# Patient Record
Sex: FEMALE | Race: WHITE | NOT HISPANIC OR LATINO | Employment: OTHER | URBAN - METROPOLITAN AREA
[De-identification: names, ages, dates, MRNs, and addresses within clinical notes are randomized per-mention and may not be internally consistent; named-entity substitution may affect disease eponyms.]

---

## 2017-02-08 ENCOUNTER — OFFICE VISIT (OUTPATIENT)
Dept: OPHTHALMOLOGY | Facility: CLINIC | Age: 72
End: 2017-02-08
Payer: COMMERCIAL

## 2017-02-08 DIAGNOSIS — H40.052 OCULAR HYPERTENSION OF LEFT EYE: Primary | ICD-10-CM

## 2017-02-08 DIAGNOSIS — H26.8 PSEUDOEXFOLIATION SYNDROME: ICD-10-CM

## 2017-02-08 PROCEDURE — 92133 CPTRZD OPH DX IMG PST SGM ON: CPT | Performed by: OPHTHALMOLOGY

## 2017-02-08 PROCEDURE — 92012 INTRM OPH EXAM EST PATIENT: CPT | Performed by: OPHTHALMOLOGY

## 2017-02-08 PROCEDURE — 92083 EXTENDED VISUAL FIELD XM: CPT | Performed by: OPHTHALMOLOGY

## 2017-02-08 PROCEDURE — 76514 ECHO EXAM OF EYE THICKNESS: CPT | Performed by: OPHTHALMOLOGY

## 2017-02-08 ASSESSMENT — EXTERNAL EXAM - LEFT EYE: OS_EXAM: NORMAL

## 2017-02-08 ASSESSMENT — TONOMETRY
OS_IOP_MMHG: 21
IOP_METHOD: TONOPEN
OD_IOP_MMHG: 15

## 2017-02-08 ASSESSMENT — VISUAL ACUITY
OD_CC+: -2
OS_CC: 20/40
CORRECTION_TYPE: GLASSES
OS_CC+: -2
OD_CC: 20/30
OS_PH_CC: 20/30-2
METHOD: SNELLEN - LINEAR

## 2017-02-08 ASSESSMENT — PACHYMETRY
OD_CT(UM): .506
OS_CT(UM): .489

## 2017-02-08 ASSESSMENT — EXTERNAL EXAM - RIGHT EYE: OD_EXAM: NORMAL

## 2017-02-08 NOTE — PROGRESS NOTES
Current Eye Medications:  Art. Tears both eyes     Subjective: Here for IOP/ HVF/OCT and Pachy today.  Uses Flonase daily as well, 2 sprays each nose.      Objective:  See Ophthalmology Exam.       Assessment:  Stable intraocular pressure both eyes.  Baseline glaucoma OCT and Jennings Visual Field within normal limits both eyes.  Thin corneas on pachymetry.      Plan: Continue observation regarding glaucoma suspect status  Use artificial tears up to 4 times daily (Refresh Tears or Systane Ultra/Balance)   Return visit 6 months for a complete exam     Dhruv Estrella M.D.

## 2017-02-08 NOTE — MR AVS SNAPSHOT
"              After Visit Summary   2/8/2017    Sharmin Shipley    MRN: [id number]           Patient Information     Date Of Birth          1945        Visit Information        Provider Department      2/8/2017 1:15 PM Dhruv Estrella MD [facility]        Today's Diagnoses     Ocular hypertension of left eye    -  1       Care Instructions    Continue observation regarding glaucoma suspect status  Return visit 6 months for a complete exam     Dhruv Estrella M.D.          Follow-ups after your visit        Your next 10 appointments already scheduled     Aug 15, 2017  1:00 PM   New Visit with Dhruv Estrella MD   [facility] ([facility]    [address] [address] [phone]              Who to contact     If you have questions or need follow up information about today's clinic visit or your schedule please contact [facility] directly at [phone].  Normal or non-critical lab and imaging results will be communicated to you by MyChart, letter or phone within 4 business days after the clinic has received the results. If you do not hear from us within 7 days, please contact the clinic through BioDermhart or phone. If you have a critical or abnormal lab result, we will notify you by phone as soon as possible.  Submit refill requests through [employer] or call your pharmacy and they will forward the refill request to us. Please allow 3 business days for your refill to be completed.          Additional Information About Your Visit        BioDermhart Information     [employer] lets you send messages to your doctor, view your test results, renew your prescriptions, schedule appointments and more. To sign up, go to [email or web address] . Click on \"Log in\" on the left side of the screen, which will take you to the Welcome page. Then click on \"Sign up Now\" on the right side of the page.     You will be asked to enter the access code listed " below, as well as some personal information. Please follow the directions to create your username and password.     Your access code is: [email or web address]  Expires: 2017  2:36 PM     Your access code will  in 90 days. If you need help or a new code, please call your [place] clinic or [phone].        Care EveryWhere ID     This is your Care EveryWhere ID. This could be used by other organizations to access your [place] medical records  [id number]         Blood Pressure from Last 3 Encounters:   No data found for BP    Weight from Last 3 Encounters:   No data found for Wt              Today, you had the following     No orders found for display       Primary Care Provider Office Phone # Fax #    Edilberto DALTON Tariq [phone]       [facility] [address] [address]        Thank you!     Thank you for choosing [facility] [place]  for your care. Our goal is always to provide you with excellent care. Hearing back from our patients is one way we can continue to improve our services. Please take a few minutes to complete the written survey that you may receive in the mail after your visit with us. Thank you!             Your Updated Medication List - Protect others around you: Learn how to safely use, store and throw away your medicines at www.disposemymeds.org.          This list is accurate as of: 17  2:36 PM.  Always use your most recent med list.                   Brand Name Dispense Instructions for use    ACIPHEX PO      Take  by mouth.       amLODIPine 5 MG tablet    NORVASC     Take 5 mg by mouth daily.       COREG PO      Take  by mouth.       COZAAR PO      Take  by mouth.       hydrochlorothiazide 12.5 MG capsule    MICROZIDE     Take 12.5 mg by mouth daily.       lisinopril 5 MG tablet    PRINIVIL/ZESTRIL     Take 5 mg by mouth daily       PLAVIX 75 MG tablet   Generic drug:  clopidogrel      Take by mouth daily       SYNTHROID PO      Take  by  mouth.

## 2017-02-08 NOTE — PATIENT INSTRUCTIONS
Continue observation regarding glaucoma suspect status  Use artificial tears up to 4 times daily (Refresh Tears or Systane Ultra/Balance)   Return visit 6 months for a complete exam     Dhruv Estrella M.D.

## 2017-04-05 ENCOUNTER — TELEPHONE (OUTPATIENT)
Dept: OPHTHALMOLOGY | Facility: CLINIC | Age: 72
End: 2017-04-05

## 2017-04-05 NOTE — TELEPHONE ENCOUNTER
Patient is taking a medication that she thinks may be causing her high eye pressures with glaucoma. She would like to know if she should be taking this medication or not:      hydrochlorothiazide (MICROZIDE) 12.5 MG capsule      Please call the patient at home. Thank you.

## 2017-04-05 NOTE — TELEPHONE ENCOUNTER
Returned patient's call regarding HCTZ and increase in IOP.  I informed patient that increase in intraocular pressure is not one of the side effects of HCTZ, and not to be concerned.

## 2017-08-15 ENCOUNTER — OFFICE VISIT (OUTPATIENT)
Dept: OPHTHALMOLOGY | Facility: CLINIC | Age: 72
End: 2017-08-15
Payer: COMMERCIAL

## 2017-08-15 DIAGNOSIS — H18.9 KERATOPATHY: ICD-10-CM

## 2017-08-15 DIAGNOSIS — H25.813 COMBINED FORMS OF AGE-RELATED CATARACT OF BOTH EYES: Primary | ICD-10-CM

## 2017-08-15 DIAGNOSIS — H52.4 PRESBYOPIA: ICD-10-CM

## 2017-08-15 DIAGNOSIS — H43.813 POSTERIOR VITREOUS DETACHMENT, BILATERAL: ICD-10-CM

## 2017-08-15 DIAGNOSIS — H26.8 PSEUDOEXFOLIATION SYNDROME: ICD-10-CM

## 2017-08-15 DIAGNOSIS — H40.052 OCULAR HYPERTENSION OF LEFT EYE: ICD-10-CM

## 2017-08-15 PROCEDURE — 92014 COMPRE OPH EXAM EST PT 1/>: CPT | Performed by: OPHTHALMOLOGY

## 2017-08-15 PROCEDURE — 92015 DETERMINE REFRACTIVE STATE: CPT | Performed by: OPHTHALMOLOGY

## 2017-08-15 ASSESSMENT — TONOMETRY
OD_IOP_MMHG: 14
IOP_METHOD: ICARE
OD_IOP_MMHG: 17
OS_IOP_MMHG: 17
IOP_METHOD: TONOPEN
OS_IOP_MMHG: 23

## 2017-08-15 ASSESSMENT — CONF VISUAL FIELD
OS_NORMAL: 1
OD_NORMAL: 1

## 2017-08-15 ASSESSMENT — EXTERNAL EXAM - RIGHT EYE: OD_EXAM: NORMAL

## 2017-08-15 ASSESSMENT — REFRACTION_WEARINGRX
OS_SPHERE: +1.75
OS_ADD: +3.00
OD_CYLINDER: +1.25
OD_AXIS: 030
OS_CYLINDER: +1.75
OD_SPHERE: +0.75
SPECS_TYPE: PAL
OD_ADD: +3.00
OS_AXIS: 060

## 2017-08-15 ASSESSMENT — VISUAL ACUITY
OS_CC: J1 CLOSE
OD_CC: 20/30
OS_CC: 20/50
CORRECTION_TYPE: GLASSES
OD_CC: J2+
METHOD: SNELLEN - LINEAR

## 2017-08-15 ASSESSMENT — REFRACTION_MANIFEST
OD_ADD: +3.00
OD_SPHERE: +1.25
OS_AXIS: 080
OD_AXIS: 016
OS_SPHERE: +0.25
OS_CYLINDER: +1.25
OS_ADD: +3.00
OD_CYLINDER: +1.25

## 2017-08-15 ASSESSMENT — CUP TO DISC RATIO
OD_RATIO: 0.2
OS_RATIO: 0.4

## 2017-08-15 ASSESSMENT — EXTERNAL EXAM - LEFT EYE: OS_EXAM: NORMAL

## 2017-08-15 NOTE — PROGRESS NOTES
Current Eye Medications:  no     Subjective:  Comprehensive eye exam.   Pt reports she is seeing pretty good from her point of view and eyes seem otherwise fine, has not really noticed any change in her vision or eyes.     Objective:  See Ophthalmology Exam.       Assessment:  Stable eye exam.      ICD-10-CM    1. 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 Keratopathy, mild-mod, os > od H18.9    5. Posterior vitreous detachment, bilateral H43.813    6. Presbyopia H52.4 REFRACTIVE STATUS        Plan:  Possible posterior vitreous detachment (sudden onset large floater and/or flashing lights) discussed. Right eye   Continue observation with regard to glaucoma suspect status.  Glasses Rx given - optional   Call in April 2018 for an appointment in August 2018 for Complete Exam    Dr. Estrella [phone]

## 2017-08-15 NOTE — PATIENT INSTRUCTIONS
Possible posterior vitreous detachment (sudden onset large floater and/or flashing lights) discussed. Right eye   Continue observation with regard to glaucoma suspect status.  Glasses Rx given - optional   Call in April 2018 for an appointment in August 2018 for Complete Exam    Dr. Estrella [phone]

## 2017-08-15 NOTE — MR AVS SNAPSHOT
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 Right eye   Continue observation with regard to glaucoma suspect status.  Glasses Rx given - optional   Call in April 2018 for an appointment in August 2018 for Complete Exam    Dr. Estrella [phone]          Follow-ups after your visit        Who to contact     If you have questions or need follow up information about today's clinic visit or your schedule please contact [facility] directly at [phone].  Normal or non-critical lab and imaging results will be communicated to you by MyChart, letter or phone within 4 business days after the clinic has received the results. If you do not hear from us within 7 days, please contact the clinic through [employer] or phone. If you have a critical or abnormal lab result, we will notify you by phone as soon as possible.  Submit refill requests through [employer] or call your pharmacy and they will forward the refill request to us. Please allow 3 business days for your refill to be completed.          Additional Information About Your Visit        MyChart Information     [employer] lets you send messages to your doctor, view your test results, renew your prescriptions, schedule appointments and more. To sign up, go to [email or web address] .  "Click on \"Log in\" on the left side of the screen, which will take you to the Welcome page. Then click on \"Sign up Now\" on the right side of the page.     You will be asked to enter the access code listed below, as well as some personal information. Please follow the directions to create your username and password.     Your access code is: [email or web address]  Expires: 2017  2:00 PM     Your access code will  in 90 days. If you need help or a new code, please call your [place] clinic or [phone].        Care EveryWhere ID     This is your Care EveryWhere ID. This could be used by other organizations to access your [place] medical records  [id number]         Blood Pressure from Last 3 Encounters:   No data found for BP    Weight from Last 3 Encounters:   No data found for Wt              Today, you had the following     No orders found for display       Primary Care Provider Office Phone # Fax #    Edilberto DALTON Tariq [phone]       [facility] [address] [address]        Equal Access to Services     [facility] Hospital[facility] : Hadii aad ku hadasho Soomaali, waaxda luqadaha, qaybta kaalmada adeluz elenayamary, alli childs . So [facility] [phone].    ATENCIÓN: Si habla español, tiene a jones disposición servicios gratuitos de asistencia lingüística. Earnestine al [phone].    We comply with applicable federal civil rights laws and [place] laws. We do not discriminate on the basis of race, color, national origin, age, disability sex, sexual orientation or gender identity.            Thank you!     Thank you for choosing [facility] [place]  for your care. Our goal is always to provide you with excellent care. Hearing back from our patients is one way we can continue to improve our services. Please take a few minutes to complete the written survey that you may receive in the mail after your visit with us. Thank you!             Your Updated Medication List " - Protect others around you: Learn how to safely use, store and throw away your medicines at www.disposemymeds.org.          This list is accurate as of: 8/15/17  2:01 PM.  Always use your most recent med list.                   Brand Name Dispense Instructions for use Diagnosis    ACIPHEX PO      Take  by mouth.        amLODIPine 5 MG tablet    NORVASC     Take 5 mg by mouth daily.        COREG PO      Take  by mouth.        COZAAR PO      Take  by mouth.        hydrochlorothiazide 12.5 MG capsule    MICROZIDE     Take 12.5 mg by mouth daily.        lisinopril 5 MG tablet    PRINIVIL/ZESTRIL     Take 5 mg by mouth daily        PLAVIX 75 MG tablet   Generic drug:  clopidogrel      Take by mouth daily        SYNTHROID PO      Take  by mouth.

## 2017-09-18 ENCOUNTER — TELEPHONE (OUTPATIENT)
Dept: OPHTHALMOLOGY | Facility: CLINIC | Age: 72
End: 2017-09-18

## 2017-09-18 NOTE — TELEPHONE ENCOUNTER
Spoke to patient, she would rather come tomorrow for her Rx recheck instead of Thursday.  She will be here at 3:45 pm.

## 2017-09-18 NOTE — TELEPHONE ENCOUNTER
Called and left message that patient could come tomorrow at 3:45 or 4 instead of Thursday this week for a glasses recheck.

## 2017-09-19 ENCOUNTER — OFFICE VISIT (OUTPATIENT)
Dept: OPHTHALMOLOGY | Facility: CLINIC | Age: 72
End: 2017-09-19
Payer: COMMERCIAL

## 2017-09-19 DIAGNOSIS — H52.4 PRESBYOPIA: Primary | ICD-10-CM

## 2017-09-19 PROCEDURE — 99207 ZZC NO BILLABLE SERVICE THIS VISIT: CPT | Performed by: OPHTHALMOLOGY

## 2017-09-19 ASSESSMENT — REFRACTION_MANIFEST
OS_ADD: +3.50
OD_AXIS: 015
OS_AXIS: 075
OS_CYLINDER: +1.25
OD_CYLINDER: +1.50
OD_ADD: +3.50
OD_SPHERE: +0.50
OS_SPHERE: +0.50

## 2017-09-19 ASSESSMENT — VISUAL ACUITY
OD_CC: J2+
OS_CC: 20/25
METHOD: SNELLEN - LINEAR
CORRECTION_TYPE: GLASSES
OD_CC: 20/25
OS_CC: J2

## 2017-09-19 ASSESSMENT — REFRACTION_WEARINGRX
OS_ADD: +3.00
OD_ADD: +3.00
OD_CYLINDER: +1.50
OS_AXIS: 080
OS_CYLINDER: +1.25
OS_SPHERE: +0.50
OD_AXIS: 020
OD_SPHERE: +1.25

## 2017-09-19 NOTE — PROGRESS NOTES
Current Eye Medications:  no     Subjective:  Problem with glasses.  Pt reports can read better with her old glasses compared to her older ones. I carefully refracted pt. I found a difference mostly in right eye. Also noted that pt is overplused in old glasses giving her additional reading power,with regrind  I made the add a +3.50 rather than a plus +3.00.     Objective:  See Ophthalmology Exam.       Assessment:  Refractive shift.      Plan:  Regrind per tech assessment.  Dhruv Estrella M.D.

## 2017-09-19 NOTE — MR AVS SNAPSHOT
"              After Visit Summary   2017    Sharmin Shipley    MRN: [id number]           Patient Information     Date Of Birth          1945        Visit Information        Provider Department      2017 4:00 PM Dhruv Estrella MD [facility]        Today's Diagnoses     Presbyopia    -  1      Care Instructions    See progress note.          Follow-ups after your visit        Who to contact     If you have questions or need follow up information about today's clinic visit or your schedule please contact [facility] directly at [phone].  Normal or non-critical lab and imaging results will be communicated to you by P[employer]ruhart, letter or phone within 4 business days after the clinic has received the results. If you do not hear from us within 7 days, please contact the clinic through P[employer]ruhart or phone. If you have a critical or abnormal lab result, we will notify you by phone as soon as possible.  Submit refill requests through [employer] or call your pharmacy and they will forward the refill request to us. Please allow 3 business days for your refill to be completed.          Additional Information About Your Visit        MyChart Information     [employer] lets you send messages to your doctor, view your test results, renew your prescriptions, schedule appointments and more. To sign up, go to [email or web address] . Click on \"Log in\" on the left side of the screen, which will take you to the Welcome page. Then click on \"Sign up Now\" on the right side of the page.     You will be asked to enter the access code listed below, as well as some personal information. Please follow the directions to create your username and password.     Your access code is: [email or web address]  Expires: 2017  2:00 PM     Your access code will  in 90 days. If you need help or a new code, please call your [facility] or [phone].        Care EveryWhere ID     This is your Care EveryWhere ID. " This could be used by other organizations to access your [place] medical records  [id number]         Blood Pressure from Last 3 Encounters:   No data found for BP    Weight from Last 3 Encounters:   No data found for Wt              We Performed the Following     NO CHARGE LOS        Primary Care Provider Office Phone # Fax #    Edilberto Tariq [phone]       [facility] [address] [address]        Equal Access to Services     YAMIL SHEEHAN : Hadii aad ku hadasho Soomaali, waaxda luqadaha, qaybta kaalmada adeegyada, waxay idiin hayaan adeeg khashishsh la'navid . So [facility] [phone].    ATENCIÓN: Si habla español, tiene a jones disposición servicios gratuitos de asistencia lingüística. Llame al [phone].    We comply with applicable federal civil rights laws and [place] laws. We do not discriminate on the basis of race, color, national origin, age, disability sex, sexual orientation or gender identity.            Thank you!     Thank you for choosing [facility] [place]  for your care. Our goal is always to provide you with excellent care. Hearing back from our patients is one way we can continue to improve our services. Please take a few minutes to complete the written survey that you may receive in the mail after your visit with us. Thank you!             Your Updated Medication List - Protect others around you: Learn how to safely use, store and throw away your medicines at www.disposemymeds.org.          This list is accurate as of: 9/19/17 11:14 PM.  Always use your most recent med list.                   Brand Name Dispense Instructions for use Diagnosis    ACIPHEX PO      Take  by mouth.        amLODIPine 5 MG tablet    NORVASC     Take 5 mg by mouth daily.        COREG PO      Take  by mouth.        COZAAR PO      Take  by mouth.        hydrochlorothiazide 12.5 MG capsule    MICROZIDE     Take 12.5 mg by mouth daily.        lisinopril 5 MG tablet     PRINIVIL/ZESTRIL     Take 5 mg by mouth daily        PLAVIX 75 MG tablet   Generic drug:  clopidogrel      Take by mouth daily        SYNTHROID PO      Take  by mouth.

## 2018-08-17 ENCOUNTER — OFFICE VISIT (OUTPATIENT)
Dept: OPHTHALMOLOGY | Facility: CLINIC | Age: 73
End: 2018-08-17
Payer: COMMERCIAL

## 2018-08-17 DIAGNOSIS — H52.4 PRESBYOPIA: ICD-10-CM

## 2018-08-17 DIAGNOSIS — H25.811 COMBINED FORMS OF AGE-RELATED CATARACT OF RIGHT EYE: ICD-10-CM

## 2018-08-17 DIAGNOSIS — H40.052 OCULAR HYPERTENSION OF LEFT EYE: ICD-10-CM

## 2018-08-17 DIAGNOSIS — H18.9 KERATOPATHY: ICD-10-CM

## 2018-08-17 DIAGNOSIS — H25.812 COMBINED FORMS OF AGE-RELATED CATARACT OF LEFT EYE: Primary | ICD-10-CM

## 2018-08-17 PROCEDURE — 92015 DETERMINE REFRACTIVE STATE: CPT | Performed by: OPHTHALMOLOGY

## 2018-08-17 PROCEDURE — 92014 COMPRE OPH EXAM EST PT 1/>: CPT | Performed by: OPHTHALMOLOGY

## 2018-08-17 RX ORDER — LATANOPROST 50 UG/ML
1 SOLUTION/ DROPS OPHTHALMIC AT BEDTIME
Qty: 3 BOTTLE | Refills: 4 | Status: SHIPPED | OUTPATIENT
Start: 2018-08-17 | End: 2019-03-04

## 2018-08-17 RX ORDER — LATANOPROST 50 UG/ML
1 SOLUTION/ DROPS OPHTHALMIC AT BEDTIME
Qty: 1 BOTTLE | Refills: 11 | Status: SHIPPED | OUTPATIENT
Start: 2018-08-17 | End: 2019-08-20

## 2018-08-17 ASSESSMENT — REFRACTION_WEARINGRX
OD_ADD: +3.00
OS_AXIS: 080
OS_CYLINDER: +1.25
OD_AXIS: 020
OS_ADD: +3.00
OS_SPHERE: +0.50
OD_SPHERE: +1.25
OD_CYLINDER: +1.50

## 2018-08-17 ASSESSMENT — TONOMETRY
OS_IOP_MMHG: 28
IOP_METHOD: ICARE
OD_IOP_MMHG: 15

## 2018-08-17 ASSESSMENT — CUP TO DISC RATIO
OS_RATIO: 0.4
OD_RATIO: 0.2

## 2018-08-17 ASSESSMENT — REFRACTION_MANIFEST
OS_AXIS: 080
OD_SPHERE: +1.25
OD_ADD: +2.75
OS_SPHERE: PLANO
OD_CYLINDER: +1.00
OD_AXIS: 015
OS_ADD: +2.75
OS_CYLINDER: +1.00

## 2018-08-17 ASSESSMENT — CONF VISUAL FIELD
OD_NORMAL: 1
OS_NORMAL: 1
METHOD: COUNTING FINGERS

## 2018-08-17 ASSESSMENT — VISUAL ACUITY
METHOD: SNELLEN - LINEAR
OD_CC+: -1
OS_PH_CC: 20/40-1
OS_CC+: -2
CORRECTION_TYPE: GLASSES
OS_CC: 20/50
OD_CC: 20/30

## 2018-08-17 NOTE — MR AVS SNAPSHOT
"              After Visit Summary   8/17/2018    Sharmin Shipley    MRN: [id number]           Patient Information     Date Of Birth          1945        Visit Information        Provider Department      8/17/2018 10:30 AM Dhruv Estrella MD [facility]        Today's Diagnoses     Presbyopia    -  1    Keratopathy, mild-mod, os > od        Ocular hypertension of left eye          Care Instructions    Start Latanoprost drop both eyes at bedtime.  Continue same glasses  Return visit in one month for an intraocular pressure check and gonioscopy.  Offered cataract surgery left eye at anytime. Call Ivan BREANNA @ [phone] to schedule.  Dhruv Estrella M.D.  [phone]              Follow-ups after your visit        Who to contact     If you have questions or need follow up information about today's clinic visit or your schedule please contact [facility] directly at [phone].  Normal or non-critical lab and imaging results will be communicated to you by adRisehart, letter or phone within 4 business days after the clinic has received the results. If you do not hear from us within 7 days, please contact the clinic through adRisehart or phone. If you have a critical or abnormal lab result, we will notify you by phone as soon as possible.  Submit refill requests through [employer] or call your pharmacy and they will forward the refill request to us. Please allow 3 business days for your refill to be completed.          Additional Information About Your Visit        adRiseharCare2Manage Information     [employer] lets you send messages to your doctor, view your test results, renew your prescriptions, schedule appointments and more. To sign up, go to [email or web address] . Click on \"Log in\" on the left side of the screen, which will take you to the Welcome page. Then click on \"Sign up Now\" on the right side of the page.     You will be asked to enter the access code listed below, as well as some personal " information. Please follow the directions to create your username and password.     Your access code is: [email or web address]  Expires: 11/15/2018 10:27 AM     Your access code will  in 90 days. If you need help or a new code, please call your [place] clinic or [phone].        Care EveryWhere ID     This is your Care EveryWhere ID. This could be used by other organizations to access your [place] medical records  [id number]         Blood Pressure from Last 3 Encounters:   No data found for BP    Weight from Last 3 Encounters:   No data found for Wt              We Performed the Following     EYE EXAM (SIMPLE-NONBILLABLE)     REFRACTIVE STATUS          Today's Medication Changes          These changes are accurate as of 18 12:09 PM.  If you have any questions, ask your nurse or doctor.               Start taking these medicines.        Dose/Directions    latanoprost 0.005 % ophthalmic solution   Commonly known as:  XALATAN   Used for:  Ocular hypertension of left eye   Started by:  Dhruv Estrella MD        Dose:  1 drop   Place 1 drop into both eyes At Bedtime   Quantity:  1 Bottle   Refills:  11            Where to get your medicines      These medications were sent to [id number] - [place], [place] - [address] [place] [address]     Phone:  [phone]     latanoprost 0.005 % ophthalmic solution                Primary Care Provider Office Phone # Fax #    Edilberto Tariq [phone]       [facility] [address] [address]        Equal Access to Services     CAPO SHEEHAN AH: Hadii aad ku hadasho Soomaali, waaxda luqadaha, qaybta kaalmada adeegyada, alli [place]. So [facility] [phone].    ATENCIÓN: Si habla español, tiene a jones disposición servicios gratuitos de asistencia lingüística. Llame al [phone].    We comply with applicable federal civil rights  laws and [place] laws. We do not discriminate on the basis of race, color, national origin, age, disability, sex, sexual orientation, or gender identity.            Thank you!     Thank you for choosing [facility] [place]  for your care. Our goal is always to provide you with excellent care. Hearing back from our patients is one way we can continue to improve our services. Please take a few minutes to complete the written survey that you may receive in the mail after your visit with us. Thank you!             Your Updated Medication List - Protect others around you: Learn how to safely use, store and throw away your medicines at www.disposemymeds.org.          This list is accurate as of 8/17/18 12:09 PM.  Always use your most recent med list.                   Brand Name Dispense Instructions for use Diagnosis    ACIPHEX PO      Take  by mouth.        amLODIPine 5 MG tablet    NORVASC     Take 5 mg by mouth daily.        COREG PO      Take  by mouth.        COZAAR PO      Take  by mouth.        hydrochlorothiazide 12.5 MG capsule    MICROZIDE     Take 12.5 mg by mouth daily.        latanoprost 0.005 % ophthalmic solution    XALATAN    1 Bottle    Place 1 drop into both eyes At Bedtime    Ocular hypertension of left eye       lisinopril 5 MG tablet    PRINIVIL/ZESTRIL     Take 5 mg by mouth daily        Metoprolol Succinate 50 MG Cs24      Take 1 tablet by mouth 2 times daily        PLAVIX 75 MG tablet   Generic drug:  clopidogrel      Take by mouth daily        SYNTHROID PO      Take  by mouth.

## 2018-08-17 NOTE — PROGRESS NOTES
Current Eye Medications:  none     Subjective:  Complete eye exam. Vision is doing pretty well both eyes. No eye pain or discomfort in either eye.      Objective:  See Ophthalmology Exam.       Assessment:  Cataract left eye now visually significant.  Intraocular pressure too high left eye.      ICD-10-CM    1. Combined forms of age-related cataract, mod, of left eye H25.812 EYE EXAM (SIMPLE-NONBILLABLE)   2. Combined forms of age-related cataract, mild-mod, of right eye H25.811    3. 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 Keratopathy, mild-mod, os > od H18.9    5. Presbyopia H52.4 REFRACTIVE STATUS        Plan:  Encouraged to discuss smoking cessation with PCP.  Start Latanoprost drop both eyes at bedtime.  Continue same glasses  Return visit in one month for an intraocular pressure check and gonioscopy.  Offered cataract surgery left eye at anytime. Call Ivan CARLOS @ [phone] to schedule.  Dhruv Estrella M.D.  [phone]

## 2018-08-17 NOTE — PATIENT INSTRUCTIONS
Start Latanoprost drop both eyes at bedtime.  Continue same glasses  Return visit in one month for an intraocular pressure check and gonioscopy.  Offered cataract surgery left eye at anytime. Call Ivan CARLOS @ [phone] to schedule.  Dhruv Estrella M.D.  [phone]

## 2018-08-17 NOTE — LETTER
8/17/2018         RE: Sharmin Shipley  [address] [facility] [address]        Dear Colleague,    Thank you for referring your patient, Sharmin Shipley, to the [facility]. Please see a copy of my visit note below.     Current Eye Medications:  none     Subjective:  Complete eye exam. Vision is doing pretty well both eyes. No eye pain or discomfort in either eye.      Objective:  See Ophthalmology Exam.       Assessment:  Cataract left eye now visually significant.  Intraocular pressure too high left eye.      ICD-10-CM    1. Combined forms of age-related cataract, mod, of left eye H25.812 EYE EXAM (SIMPLE-NONBILLABLE)   2. Combined forms of age-related cataract, mild-mod, of right eye H25.811    3. 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 Keratopathy, mild-mod, os > od H18.9    5. Presbyopia H52.4 REFRACTIVE STATUS        Plan:  Encouraged to discuss smoking cessation with PCP.  Start Latanoprost drop both eyes at bedtime.  Continue same glasses  Return visit in one month for an intraocular pressure check and gonioscopy.  Offered cataract surgery left eye at anytime. Call Ivan CARLOS @ [phone] to schedule.  hDruv Estrella M.D.  [phone]             Again, thank you for allowing me to participate in the care of your patient.        Sincerely,        Dhruv Estrella MD

## 2018-08-18 PROBLEM — H25.811 COMBINED FORMS OF AGE-RELATED CATARACT OF RIGHT EYE: Status: ACTIVE | Noted: 2018-08-18

## 2018-08-18 PROBLEM — H25.812 COMBINED FORMS OF AGE-RELATED CATARACT OF LEFT EYE: Status: ACTIVE | Noted: 2018-08-18

## 2018-08-18 ASSESSMENT — EXTERNAL EXAM - LEFT EYE: OS_EXAM: MILD-MOD BROW

## 2018-08-18 ASSESSMENT — EXTERNAL EXAM - RIGHT EYE: OD_EXAM: MILD-MOD BROW

## 2018-09-18 ENCOUNTER — OFFICE VISIT (OUTPATIENT)
Dept: OPHTHALMOLOGY | Facility: CLINIC | Age: 73
End: 2018-09-18
Payer: COMMERCIAL

## 2018-09-18 DIAGNOSIS — H26.8 PSEUDOEXFOLIATION SYNDROME: ICD-10-CM

## 2018-09-18 DIAGNOSIS — H40.052 OCULAR HYPERTENSION OF LEFT EYE: Primary | ICD-10-CM

## 2018-09-18 PROBLEM — H25.813 COMBINED FORMS OF AGE-RELATED CATARACT OF BOTH EYES: Status: RESOLVED | Noted: 2017-08-15 | Resolved: 2018-09-18

## 2018-09-18 PROCEDURE — 92020 GONIOSCOPY: CPT | Performed by: OPHTHALMOLOGY

## 2018-09-18 PROCEDURE — 92012 INTRM OPH EXAM EST PATIENT: CPT | Performed by: OPHTHALMOLOGY

## 2018-09-18 ASSESSMENT — EXTERNAL EXAM - LEFT EYE: OS_EXAM: MILD-MOD BROW

## 2018-09-18 ASSESSMENT — VISUAL ACUITY
OS_CC: 20/60
OS_PH_CC: 20/50-2
OS_CC+: -1
OD_CC: 20/25
METHOD: SNELLEN - LINEAR
OD_CC+: -3
CORRECTION_TYPE: GLASSES

## 2018-09-18 ASSESSMENT — TONOMETRY
OD_IOP_MMHG: 15
OS_IOP_MMHG: 22
IOP_METHOD: APPLANATION

## 2018-09-18 ASSESSMENT — GONIOSCOPY
OD_NASAL: GRADE 2
OD_TEMPORAL: GRADE 1
OS_SUPERIOR: GRADE 2
OS_TEMPORAL: GRADE 2
OD_SUPERIOR: GRADE 2
OS_INFERIOR: GRADE 2
OS_NASAL: GRADE 1
OD_INFERIOR: GRADE 2

## 2018-09-18 ASSESSMENT — REFRACTION_WEARINGRX
SPECS_TYPE: PAL
OD_SPHERE: +1.25
OS_CYLINDER: +1.25
OS_ADD: +3.00
OD_CYLINDER: +1.50
OS_SPHERE: +0.50
OD_ADD: +3.00
OD_AXIS: 020
OS_AXIS: 080

## 2018-09-18 ASSESSMENT — EXTERNAL EXAM - RIGHT EYE: OD_EXAM: MILD-MOD BROW

## 2018-09-18 NOTE — MR AVS SNAPSHOT
"              After Visit Summary   9/18/2018    Sharmin Shipley    MRN: [id number]           Patient Information     Date Of Birth          1945        Visit Information        Provider Department      9/18/2018 12:45 PM Dhruv Estrella MD [facility]        Today's Diagnoses     Ocular hypertension of left eye    -  1      Care Instructions    Continue using Latanoprost both eyes at bedtime.   Return visit in 5 months for intraocular pressure check, glaucoma OCT, [facility].     Dhruv Estrella M.D.  [phone]            Follow-ups after your visit        Who to contact     If you have questions or need follow up information about today's clinic visit or your schedule please contact [facility] directly at [phone].  Normal or non-critical lab and imaging results will be communicated to you by MyChart, letter or phone within 4 business days after the clinic has received the results. If you do not hear from us within 7 days, please contact the clinic through MyChart or phone. If you have a critical or abnormal lab result, we will notify you by phone as soon as possible.  Submit refill requests through [employer] or call your pharmacy and they will forward the refill request to us. Please allow 3 business days for your refill to be completed.          Additional Information About Your Visit        MyChart Information     [employer]it lets you send messages to your doctor, view your test results, renew your prescriptions, schedule appointments and more. To sign up, go to [email or web address] . Click on \"Log in\" on the left side of the screen, which will take you to the Welcome page. Then click on \"Sign up Now\" on the right side of the page.     You will be asked to enter the access code listed below, as well as some personal information. Please follow the directions to create your username and password.     Your access code is: [email or web address]  Expires: 12/17/2018 12:29 " PM     Your access code will  in 90 days. If you need help or a new code, please call your [facility] clinic or [phone].        Care EveryWhere ID     This is your Care EveryWhere ID. This could be used by other organizations to access your [facility] medical records  [id number]         Blood Pressure from Last 3 Encounters:   No data found for BP    Weight from Last 3 Encounters:   No data found for Wt              Today, you had the following     No orders found for display       Primary Care Provider Office Phone # Fax #    Edilberto Tariq [phone]       [facility] [address] [address]        Equal Access to Services     CAPO SHEEHAN : Hadii aad ku hadasho Soomaali, waaxda luqadaha, qaybta kaalmada adeegyada, waxay renatain hayaan lito childs . So [facility] [phone].    ATENCIÓN: Si habla español, tiene a jones disposición servicios gratuitos de asistencia lingüística. Llame al [phone].    We comply with applicable federal civil rights laws and [place] laws. We do not discriminate on the basis of race, color, national origin, age, disability, sex, sexual orientation, or gender identity.            Thank you!     Thank you for choosing [facility]  for your care. Our goal is always to provide you with excellent care. Hearing back from our patients is one way we can continue to improve our services. Please take a few minutes to complete the written survey that you may receive in the mail after your visit with us. Thank you!             Your Updated Medication List - Protect others around you: Learn how to safely use, store and throw away your medicines at www.disposemymeds.org.          This list is accurate as of 18  1:14 PM.  Always use your most recent med list.                   Brand Name Dispense Instructions for use Diagnosis    ACIPHEX PO      Take  by mouth.        amLODIPine 5 MG tablet    NORVASC     Take 5 mg by mouth  daily.        COREG PO      Take  by mouth.        COZAAR PO      Take  by mouth.        hydrochlorothiazide 12.5 MG capsule    MICROZIDE     Take 12.5 mg by mouth daily.        * latanoprost 0.005 % ophthalmic solution    XALATAN    1 Bottle    Place 1 drop into both eyes At Bedtime    Ocular hypertension of left eye       * latanoprost 0.005 % ophthalmic solution    XALATAN    3 Bottle    Place 1 drop into both eyes At Bedtime    Ocular hypertension of left eye       lisinopril 5 MG tablet    PRINIVIL/ZESTRIL     Take 5 mg by mouth daily        Metoprolol Succinate 50 MG Cs24      Take 1 tablet by mouth 2 times daily        PLAVIX 75 MG tablet   Generic drug:  clopidogrel      Take by mouth daily        SYNTHROID PO      Take  by mouth.        * Notice:  This list has 2 medication(s) that are the same as other medications prescribed for you. Read the directions carefully, and ask your doctor or other care provider to review them with you.

## 2018-09-18 NOTE — PATIENT INSTRUCTIONS
Continue using Latanoprost both eyes at bedtime.   Return visit in 5 months for intraocular pressure check, glaucoma OCT, [facility] Visual Field.     Dhruv Estrella M.D.  [phone]

## 2018-09-18 NOTE — LETTER
9/18/2018         RE: Sharmin Shipley  [address] [facility] [address]        Dear Colleague,    Thank you for referring your patient, Sharmin Shipley, to the [facility]. Please see a copy of my visit note below.     Current Eye Medications:  Latanoprost at bedtime both eyes, last took at 10 pm.     Subjective:  One month follow up for an intraocular pressure check and gonioscopy. Vision is fine both eyes. No eye pain or discomfort in either eye.      Objective:  See Ophthalmology Exam.       Assessment:  Good initial reduction in intraocular pressure both eyes with Latanoprost.      Plan:  Encouraged to discuss smoking cessation with PCP.  Continue using Latanoprost both eyes at bedtime.   Return visit in 5 months for intraocular pressure check, glaucoma OCT, [facility] Visual Field.     Dhruv Estrella M.D.  [phone]             Again, thank you for allowing me to participate in the care of your patient.        Sincerely,        Dhruv Estrella MD

## 2018-09-18 NOTE — PROGRESS NOTES
Current Eye Medications:  Latanoprost at bedtime both eyes, last took at 10 pm.     Subjective:  One month follow up for an intraocular pressure check and gonioscopy. Vision is fine both eyes. No eye pain or discomfort in either eye.      Objective:  See Ophthalmology Exam.       Assessment:  Good initial reduction in intraocular pressure both eyes with Latanoprost.      Plan:  Encouraged to discuss smoking cessation with PCP.  Continue using Latanoprost both eyes at bedtime.   Return visit in 5 months for intraocular pressure check, glaucoma OCT, [facility] Visual Field.     Dhruv Estrella M.D.  [phone]

## 2019-03-04 ENCOUNTER — OFFICE VISIT (OUTPATIENT)
Dept: OPHTHALMOLOGY | Facility: CLINIC | Age: 74
End: 2019-03-04
Payer: COMMERCIAL

## 2019-03-04 DIAGNOSIS — H40.052 OCULAR HYPERTENSION OF LEFT EYE: Primary | ICD-10-CM

## 2019-03-04 DIAGNOSIS — H26.8 PSEUDOEXFOLIATION SYNDROME: ICD-10-CM

## 2019-03-04 PROCEDURE — 92133 CPTRZD OPH DX IMG PST SGM ON: CPT | Performed by: OPHTHALMOLOGY

## 2019-03-04 PROCEDURE — 92012 INTRM OPH EXAM EST PATIENT: CPT | Performed by: OPHTHALMOLOGY

## 2019-03-04 RX ORDER — LATANOPROST 50 UG/ML
1 SOLUTION/ DROPS OPHTHALMIC AT BEDTIME
Qty: 3 BOTTLE | Refills: 4 | Status: SHIPPED | OUTPATIENT
Start: 2019-03-04 | End: 2020-04-28

## 2019-03-04 ASSESSMENT — VISUAL ACUITY
METHOD: SNELLEN - LINEAR
OS_PH_CC+: -1
OS_CC+: -2
OD_CC: 20/20
OS_CC: 20/60
OS_PH_CC: 20/40
OD_CC+: -3
CORRECTION_TYPE: GLASSES

## 2019-03-04 ASSESSMENT — TONOMETRY
OD_IOP_MMHG: 16
IOP_METHOD: ICARE
OS_IOP_MMHG: 19

## 2019-03-04 ASSESSMENT — EXTERNAL EXAM - RIGHT EYE: OD_EXAM: MILD-MOD BROW

## 2019-03-04 ASSESSMENT — EXTERNAL EXAM - LEFT EYE: OS_EXAM: MILD-MOD BROW

## 2019-03-04 NOTE — PROGRESS NOTES
Current Eye Medications:  Latanoprost both eyes at bedtime     Subjective:  Here for HVF and OCT today for glaucoma follow up. . HVF is not working at the moment.  Having a femoral artery procedure soon, tomorrow seeing the vein mapping. Patient has small pupils and has cataract, emeka left eye.      Objective:  See Ophthalmology Exam.       Assessment:  Stable intraocular pressure and glaucoma OCT both eyes in patient with treated ocular hypertension left eye and hx of pseudoexfoliation syndrome right eye.      Plan:  Continue using Latanoprost (green top) both eyes at bedtime.   Encouraged to discuss smoking cessation with PCP.  Return visit in 5 months for complete exam.     Dhruv Estrella M.D.  [phone]

## 2019-03-04 NOTE — PATIENT INSTRUCTIONS
Continue using Latanoprost (green top) both eyes at bedtime.   Encouraged to discuss smoking cessation with PCP.  Return visit in 5 months for complete exam.     Dhruv Estrella M.D.  [phone]

## 2019-03-04 NOTE — LETTER
3/4/2019         RE: Sharmin Shipley  [address] [facility] [place] [address]        Dear Colleague,    Thank you for referring your patient, Sharmin Shipley, to the [facility]. Please see a copy of my visit note below.     Current Eye Medications:  Latanoprost both eyes at bedtime     Subjective:  Here for HVF and OCT today for glaucoma follow up. . HVF is not working at the moment.  Having a femoral artery procedure soon, tomorrow seeing the vein mapping. Patient has small pupils and has cataract, emeka left eye.      Objective:  See Ophthalmology Exam.       Assessment:  Stable intraocular pressure and glaucoma OCT both eyes in patient with treated ocular hypertension left eye and hx of pseudoexfoliation syndrome right eye.      Plan:  Continue using Latanoprost (green top) both eyes at bedtime.   Encouraged to discuss smoking cessation with PCP.  Return visit in 5 months for complete exam.     Dhruv Estrella M.D.  [phone]           Again, thank you for allowing me to participate in the care of your patient.        Sincerely,        Dhruv Estrella MD

## 2019-03-09 ASSESSMENT — PACHYMETRY
OD_CT(UM): .506
OS_CT(UM): .489

## 2019-04-15 ENCOUNTER — DOCUMENTATION ONLY (OUTPATIENT)
Dept: OPHTHALMOLOGY | Facility: CLINIC | Age: 74
End: 2019-04-15

## 2019-08-20 ENCOUNTER — OFFICE VISIT (OUTPATIENT)
Dept: OPHTHALMOLOGY | Facility: CLINIC | Age: 74
End: 2019-08-20
Payer: COMMERCIAL

## 2019-08-20 DIAGNOSIS — H43.813 POSTERIOR VITREOUS DETACHMENT, BILATERAL: ICD-10-CM

## 2019-08-20 DIAGNOSIS — H25.811 COMBINED FORMS OF AGE-RELATED CATARACT OF RIGHT EYE: ICD-10-CM

## 2019-08-20 DIAGNOSIS — H35.372 EPIRETINAL MEMBRANE, LEFT EYE: ICD-10-CM

## 2019-08-20 DIAGNOSIS — H01.029 SQUAMOUS BLEPHARITIS, UNSPECIFIED LATERALITY: ICD-10-CM

## 2019-08-20 DIAGNOSIS — H52.4 PRESBYOPIA: ICD-10-CM

## 2019-08-20 DIAGNOSIS — H26.8 PSEUDOEXFOLIATION SYNDROME: ICD-10-CM

## 2019-08-20 DIAGNOSIS — H40.052 OCULAR HYPERTENSION OF LEFT EYE: ICD-10-CM

## 2019-08-20 DIAGNOSIS — H18.9 KERATOPATHY: ICD-10-CM

## 2019-08-20 DIAGNOSIS — H25.812 COMBINED FORMS OF AGE-RELATED CATARACT OF LEFT EYE: Primary | ICD-10-CM

## 2019-08-20 PROCEDURE — 92015 DETERMINE REFRACTIVE STATE: CPT | Performed by: OPHTHALMOLOGY

## 2019-08-20 PROCEDURE — 92014 COMPRE OPH EXAM EST PT 1/>: CPT | Performed by: OPHTHALMOLOGY

## 2019-08-20 ASSESSMENT — VISUAL ACUITY
OS_PH_CC: 20/50
OD_CC+: -3
OS_PH_CC+: -1
OS_CC: 20/100
METHOD: SNELLEN - LINEAR
CORRECTION_TYPE: GLASSES
OS_CC+: -1
OD_CC: 20/25

## 2019-08-20 ASSESSMENT — CUP TO DISC RATIO
OD_RATIO: 0.2
OS_RATIO: 0.4

## 2019-08-20 ASSESSMENT — REFRACTION_MANIFEST
OD_ADD: +2.50
OS_CYLINDER: +0.25
OD_SPHERE: +0.75
OD_AXIS: 017
OS_SPHERE: PLANO
OS_ADD: +2.50
OD_CYLINDER: +1.00
OS_AXIS: 070

## 2019-08-20 ASSESSMENT — EXTERNAL EXAM - LEFT EYE: OS_EXAM: MILD-MOD BROW

## 2019-08-20 ASSESSMENT — EXTERNAL EXAM - RIGHT EYE: OD_EXAM: MILD-MOD BROW

## 2019-08-20 ASSESSMENT — CONF VISUAL FIELD
METHOD: COUNTING FINGERS
OD_NORMAL: 1
OS_NORMAL: 1

## 2019-08-20 ASSESSMENT — TONOMETRY
OS_IOP_MMHG: 10
IOP_METHOD: ICARE
OD_IOP_MMHG: 09

## 2019-08-20 NOTE — PROGRESS NOTES
Current Eye Medications:  Latanoprost at bedtime both eyes, last took at 11 pm.      Subjective:  Vision is doing fine both eyes. No eye pain or discomfort in either eye.      Objective:  See Ophthalmology Exam.       Assessment:  Cataract remains visually significant left eye; approaching so right eye.  Stable intraocular pressure and discs.      ICD-10-CM    1. Combined forms of age-related cataract, mod, of left eye H25.812    2. Combined forms of age-related cataract, mild-mod, of right eye H25.811    3. 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 Ocular hypertension of left eye - treated H40.052    5. 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 Keratopathy, mild-mod, os > od H18.9    7. Epiretinal membrane, left eye H35.372    8. Posterior vitreous detachment, bilateral H43.813    9. Presbyopia H52.4 REFRACTION     EYE EXAM (SIMPLE-NONBILLABLE)        Plan:  Continue same medication.  Continue same glasses.  Possible posterior vitreous detachment (sudden onset large floater and/or flashing lights) right eye discussed.   Encouraged to discuss smoking cessation with PCP.   Offered cataract surgery left eye  at anytime. Call Ivan CARLOS @ [phone] to schedule.  Return visit 6 months for intraocular pressure check, glaucoma OCT retinal OCT and [facility] Visual Field.    Dhruv Estrella M.D.  [phone]

## 2019-08-20 NOTE — PATIENT INSTRUCTIONS
Continue same medication.  Continue same glasses.  Possible posterior vitreous detachment (sudden onset large floater and/or flashing lights) right eye discussed.   Encouraged to discuss smoking cessation with PCP.   Offered cataract surgery left eye  at anytime. Call Ivan CARLOS @ [phone] to schedule.  Return visit 6 months for intraocular pressure check, glaucoma OCT retinal OCT and [facility] Visual Field.    Dhruv Estrella M.D.  [phone]

## 2019-08-20 NOTE — LETTER
8/20/2019         RE: Sharmin Shipley  [address] [facility] [address]        Dear Colleague,    Thank you for referring your patient, Sharmin Shipley, to the [facility]. Please see a copy of my visit note below.     Current Eye Medications:  Latanoprost at bedtime both eyes, last took at 11 pm.      Subjective:  Vision is doing fine both eyes. No eye pain or discomfort in either eye.      Objective:  See Ophthalmology Exam.       Assessment:  Cataract remains visually significant left eye; approaching so right eye.  Stable intraocular pressure and discs.      ICD-10-CM    1. Combined forms of age-related cataract, mod, of left eye H25.812    2. Combined forms of age-related cataract, mild-mod, of right eye H25.811    3. 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 Ocular hypertension of left eye - treated H40.052    5. 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 Keratopathy, mild-mod, os > od H18.9    7. Epiretinal membrane, left eye H35.372    8. Posterior vitreous detachment, bilateral H43.813    9. Presbyopia H52.4 REFRACTION     EYE EXAM (SIMPLE-NONBILLABLE)        Plan:  Continue same medication.  Continue same glasses.  Possible posterior vitreous detachment (sudden onset large floater and/or flashing lights) right eye discussed.   Encouraged to discuss smoking cessation with PCP.   Offered cataract surgery left eye  at anytime. Call Ivan CARLOS @ [phone] to schedule.  Return visit 6 months for intraocular pressure check, glaucoma OCT retinal OCT and [facility] Visual Field.    Dhruv Estrella M.D.  [phone]             Again, thank you for allowing me to participate in the care of your patient.        Sincerely,        Dhruv Estrella MD

## 2019-11-25 ENCOUNTER — TELEPHONE (OUTPATIENT)
Dept: OPHTHALMOLOGY | Facility: CLINIC | Age: 74
End: 2019-11-25

## 2019-11-25 NOTE — TELEPHONE ENCOUNTER
Pt called stating that she has a question about her Latanprost. Stated that she has been having lots of burning in throat and sinuses, she is wondering if there is any connection of these issues to the drops?

## 2019-11-25 NOTE — TELEPHONE ENCOUNTER
Spoke to patient. She has been on the glaucoma meds since 8-2018. She does pinch the corners of her eyes after putting the drops in, told her this is good and to continue. Also she states that she does have sinus issues, suggested that she may try to see ENT for this and see if there is anything else they can do there. She will do this and see Dr. Estrella in Feb for a check up. If she still is having issues maybe a switch could be made.

## 2020-02-06 ENCOUNTER — OFFICE VISIT (OUTPATIENT)
Dept: OPHTHALMOLOGY | Facility: CLINIC | Age: 75
End: 2020-02-06
Payer: COMMERCIAL

## 2020-02-06 DIAGNOSIS — H26.8 PSEUDOEXFOLIATION SYNDROME: Primary | ICD-10-CM

## 2020-02-06 DIAGNOSIS — H40.052 OCULAR HYPERTENSION OF LEFT EYE: ICD-10-CM

## 2020-02-06 DIAGNOSIS — H25.812 COMBINED FORMS OF AGE-RELATED CATARACT OF LEFT EYE: ICD-10-CM

## 2020-02-06 PROCEDURE — 92012 INTRM OPH EXAM EST PATIENT: CPT | Performed by: OPHTHALMOLOGY

## 2020-02-06 PROCEDURE — 92083 EXTENDED VISUAL FIELD XM: CPT | Performed by: OPHTHALMOLOGY

## 2020-02-06 PROCEDURE — 92133 CPTRZD OPH DX IMG PST SGM ON: CPT | Performed by: OPHTHALMOLOGY

## 2020-02-06 ASSESSMENT — EXTERNAL EXAM - LEFT EYE: OS_EXAM: MILD-MOD BROW

## 2020-02-06 ASSESSMENT — TONOMETRY
OS_IOP_MMHG: 12
IOP_METHOD: ICARE
OD_IOP_MMHG: 09

## 2020-02-06 ASSESSMENT — VISUAL ACUITY
OS_CC: 20/125
METHOD: SNELLEN - LINEAR
CORRECTION_TYPE: GLASSES
OS_CC+: -1
OS_PH_CC: 20/40
OD_CC: 20/25

## 2020-02-06 ASSESSMENT — EXTERNAL EXAM - RIGHT EYE: OD_EXAM: MILD-MOD BROW

## 2020-02-06 NOTE — PATIENT INSTRUCTIONS
Continue same medication  Offered cataract surgery left eye at anytime. Call Ivan CARLOS @ [phone] to schedule.   Return visit 6 months for a complete exam.    Dhruv Estrella M.D.  [phone]

## 2020-02-06 NOTE — PROGRESS NOTES
Current Eye Medications:  Latanoprost at bedtime both eyes, last took at 10 pm     Subjective:  6 month follow up for intraocular pressure check, glaucoma OCT retinal OCT and Jennings Visual Field. Vision is pretty good right eye. Vision is down a little left eye, has been putting off cataract surgery. No eye pain or discomfort in either eye.      Objective:  See Ophthalmology Exam.       Assessment:  Stable intraocular pressure, glaucoma OCT, and Jennings Visual Field both eyes in patient with pseudoexfoliative glaucoma right eye and treated ocular hypertension left eye.  Cataract left eye remains visually significant.      Plan:  Continue same medication  Offered cataract surgery left eye at anytime. Call Ivan CARLOS @ [phone] to schedule.   Return visit 6 months for a complete exam.    Dhruv Estrella M.D.  [phone]

## 2020-02-06 NOTE — LETTER
2/6/2020         RE: Sharmin Shipley  [address] [facility] [address]        Dear Colleague,    Thank you for referring your patient, Sharmin Shipley, to the [facility]. Please see a copy of my visit note below.     Current Eye Medications:  Latanoprost at bedtime both eyes, last took at 10 pm     Subjective:  6 month follow up for intraocular pressure check, glaucoma OCT retinal OCT and [facility] Visual Field. Vision is pretty good right eye. Vision is down a little left eye, has been putting off cataract surgery. No eye pain or discomfort in either eye.      Objective:  See Ophthalmology Exam.       Assessment:  Stable intraocular pressure, glaucoma OCT, and Jennings Visual Field both eyes in patient with pseudoexfoliative glaucoma right eye and treated ocular hypertension left eye.  Cataract left eye remains visually significant.      Plan:  Continue same medication  Offered cataract surgery left eye at anytime. Call Ivan CARLOS @ [phone] to schedule.   Return visit 6 months for a complete exam.    Dhruv Estrella M.D.  [phone]           Again, thank you for allowing me to participate in the care of your patient.        Sincerely,        Dhruv Estrella MD

## 2020-02-23 ENCOUNTER — HEALTH MAINTENANCE LETTER (OUTPATIENT)
Age: 75
End: 2020-02-23

## 2020-04-28 DIAGNOSIS — H26.8 PSEUDOEXFOLIATION SYNDROME: ICD-10-CM

## 2020-04-28 DIAGNOSIS — H40.052 OCULAR HYPERTENSION OF LEFT EYE: ICD-10-CM

## 2020-04-28 RX ORDER — LATANOPROST 50 UG/ML
1 SOLUTION/ DROPS OPHTHALMIC AT BEDTIME
Qty: 3 BOTTLE | Refills: 4 | Status: SHIPPED | OUTPATIENT
Start: 2020-04-28 | End: 2020-10-23

## 2020-10-23 ENCOUNTER — OFFICE VISIT (OUTPATIENT)
Dept: OPHTHALMOLOGY | Facility: CLINIC | Age: 75
End: 2020-10-23
Payer: COMMERCIAL

## 2020-10-23 DIAGNOSIS — H25.811 COMBINED FORMS OF AGE-RELATED CATARACT OF RIGHT EYE: ICD-10-CM

## 2020-10-23 DIAGNOSIS — H52.4 PRESBYOPIA: ICD-10-CM

## 2020-10-23 DIAGNOSIS — H25.812 COMBINED FORMS OF AGE-RELATED CATARACT OF LEFT EYE: Primary | ICD-10-CM

## 2020-10-23 DIAGNOSIS — H40.052 OCULAR HYPERTENSION OF LEFT EYE: ICD-10-CM

## 2020-10-23 DIAGNOSIS — H43.813 POSTERIOR VITREOUS DETACHMENT, BILATERAL: ICD-10-CM

## 2020-10-23 DIAGNOSIS — Z01.01 ENCOUNTER FOR EXAMINATION OF EYES AND VISION WITH ABNORMAL FINDINGS: ICD-10-CM

## 2020-10-23 DIAGNOSIS — H26.8 PSEUDOEXFOLIATION SYNDROME: ICD-10-CM

## 2020-10-23 PROCEDURE — 92014 COMPRE OPH EXAM EST PT 1/>: CPT | Performed by: OPHTHALMOLOGY

## 2020-10-23 PROCEDURE — 92015 DETERMINE REFRACTIVE STATE: CPT | Performed by: OPHTHALMOLOGY

## 2020-10-23 ASSESSMENT — VISUAL ACUITY
CORRECTION_TYPE: GLASSES
METHOD: SNELLEN - LINEAR
OS_CC: 20/150
OD_CC+: -2
OS_PH_CC: 20/40
OD_CC: 20/30

## 2020-10-23 ASSESSMENT — REFRACTION_WEARINGRX
OS_CYLINDER: +1.50
OS_ADD: +3.00
OD_CYLINDER: +1.50
OD_SPHERE: +0.50
OS_SPHERE: +0.50
OS_AXIS: 072
OD_AXIS: 020
SPECS_TYPE: PAL
OD_ADD: +3.00

## 2020-10-23 ASSESSMENT — REFRACTION_MANIFEST
OD_ADD: +2.75
OS_CYLINDER: SPHERE
OD_CYLINDER: +1.25
OD_AXIS: 015
OD_SPHERE: PLANO
OS_ADD: +2.75
OS_SPHERE: -0.75

## 2020-10-23 ASSESSMENT — TONOMETRY
OD_IOP_MMHG: 10
IOP_METHOD: APPLANATION
OS_IOP_MMHG: 14

## 2020-10-23 ASSESSMENT — EXTERNAL EXAM - LEFT EYE: OS_EXAM: MILD-MOD BROW

## 2020-10-23 ASSESSMENT — CONF VISUAL FIELD
OD_NORMAL: 1
OS_NORMAL: 1
METHOD: COUNTING FINGERS

## 2020-10-23 ASSESSMENT — EXTERNAL EXAM - RIGHT EYE: OD_EXAM: MILD-MOD BROW

## 2020-10-23 NOTE — LETTER
10/23/2020         RE: Sharmin Shipley  [address] [facility] [address]        Dear Colleague,    Thank you for referring your patient, Sharmin Shipley, to the [facility]. Please see a copy of my visit note below.     Current Eye Medications:  Latanoprost at bedtime both eyes, last took 10 pm. Patient feels latanoprost is causing increased sinus congestion and drainage.     Subjective:  Complete eye exam. Vision is doing well in distance and near both eyes with glasses. No eye pain or discomfort in either eye.       Objective:  See Ophthalmology Exam.       Assessment:  Cataracts remain visually significant left eye > right eye.  Intraocular pressure and discs stable.      ICD-10-CM    1. Combined forms of age-related cataract, mod, of left eye  H25.812    2. Combined forms of age-related cataract, mod, of right eye  H25.811    3. 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 Ocular hypertension of left eye - treated  H40.052 latanoprost (XALATAN) 0.005 % ophthalmic solution   5. Posterior vitreous detachment, bilateral  H43.813    6. Encounter for examination of eyes and vision with abnormal findings  Z01.01    7. Presbyopia  H52.4 REFRACTION     EYE EXAM (SIMPLE-NONBILLABLE)        Plan:  Glasses Rx given - optional  Continue Latanoprost drop both eyes at bedtime.  Use artificial tears up to 4 times daily both eyes as needed.  (Refresh Tears, Systane Ultra/Balance, or Theratears)   Encouraged to discuss smoking cessation with PCP.  Cataract surgery is an option at any time left eye.  Call [phone].  Return visit 6 months for an intraocular pressure check, glaucoma OCT, retinal OCT  and [facility] Visual Field .  Dhruv Estrella M.D.  [phone]               Again, thank you for allowing me to participate in the care of your patient.        Sincerely,        Dhruv Estrella MD

## 2020-10-23 NOTE — PATIENT INSTRUCTIONS
Glasses Rx given - optional  Continue Latanoprost drop both eyes at bedtime.  Use artificial tears up to 4 times daily both eyes as needed.  (Refresh Tears, Systane Ultra/Balance, or Theratears)   Encouraged to discuss smoking cessation with PCP.  Cataract surgery is an option at any time left eye.  Call [phone].  Return visit 6 months for an intraocular pressure check, glaucoma OCT, and [facility] Visual Field .  Dhruv Estrella M.D.  [phone]

## 2020-10-23 NOTE — PROGRESS NOTES
Current Eye Medications:  Latanoprost at bedtime both eyes, last took 10 pm. Patient feels latanoprost is causing increased sinus congestion and drainage.     Subjective:  Complete eye exam. Vision is doing well in distance and near both eyes with glasses. No eye pain or discomfort in either eye.       Objective:  See Ophthalmology Exam.       Assessment:  Cataracts remain visually significant left eye > right eye.  Intraocular pressure and discs stable.      ICD-10-CM    1. Combined forms of age-related cataract, mod, of left eye  H25.812    2. Combined forms of age-related cataract, mod, of right eye  H25.811    3. 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 Ocular hypertension of left eye - treated  H40.052 latanoprost (XALATAN) 0.005 % ophthalmic solution   5. Posterior vitreous detachment, bilateral  H43.813    6. Encounter for examination of eyes and vision with abnormal findings  Z01.01    7. Presbyopia  H52.4 REFRACTION     EYE EXAM (SIMPLE-NONBILLABLE)        Plan:  Glasses Rx given - optional  Continue Latanoprost drop both eyes at bedtime.  Use artificial tears up to 4 times daily both eyes as needed.  (Refresh Tears, Systane Ultra/Balance, or Theratears)   Encouraged to discuss smoking cessation with PCP.  Cataract surgery is an option at any time left eye.  Call [phone].  Return visit 6 months for an intraocular pressure check, glaucoma OCT, retinal OCT  and Jennings Visual Field .  Dhruv Estrella M.D.  [phone]

## 2020-10-24 RX ORDER — LATANOPROST 50 UG/ML
1 SOLUTION/ DROPS OPHTHALMIC AT BEDTIME
Qty: 3 BOTTLE | Refills: 4 | Status: SHIPPED | OUTPATIENT
Start: 2020-10-24 | End: 2022-10-18

## 2020-10-24 ASSESSMENT — CUP TO DISC RATIO
OS_RATIO: 0.5
OD_RATIO: 0.4

## 2020-12-02 ENCOUNTER — TELEPHONE (OUTPATIENT)
Dept: OPHTHALMOLOGY | Facility: CLINIC | Age: 75
End: 2020-12-02

## 2020-12-02 DIAGNOSIS — H40.052 OCULAR HYPERTENSION OF LEFT EYE: ICD-10-CM

## 2020-12-02 DIAGNOSIS — H26.8 PSEUDOEXFOLIATION SYNDROME: ICD-10-CM

## 2020-12-02 RX ORDER — BRIMONIDINE TARTRATE 1.5 MG/ML
1 SOLUTION/ DROPS OPHTHALMIC 2 TIMES DAILY
Qty: 3 BOTTLE | Refills: 3 | Status: SHIPPED | OUTPATIENT
Start: 2020-12-02 | End: 2022-10-18

## 2020-12-02 NOTE — TELEPHONE ENCOUNTER
Dr. Estrella recommended trying Brimonidine 0.15% in place of the Latanoprost twice a day both eyes. Scheduled patient 1/4/20, after starting new drops for IOP check.

## 2020-12-02 NOTE — TELEPHONE ENCOUNTER
Patient called on 11/27 for eyedrops that Dr. Estrella had prescibed. She is having issues with them. I sent a message back and it was read and recorded by Tomasz. She is still waiting for a response regarding if the presription is going to be updated. Please return phone call: [phone].

## 2020-12-06 ENCOUNTER — HEALTH MAINTENANCE LETTER (OUTPATIENT)
Age: 75
End: 2020-12-06

## 2021-02-10 ENCOUNTER — TELEPHONE (OUTPATIENT)
Dept: OPHTHALMOLOGY | Facility: CLINIC | Age: 76
End: 2021-02-10

## 2021-02-10 NOTE — TELEPHONE ENCOUNTER
Patient was not feeling well and cancelled appt. For today. She is still waiting for a return phone call from Dr. Estrella regarding the possibility of changing her eyedrop RX. Please call to discuss. [phone].

## 2021-02-12 NOTE — TELEPHONE ENCOUNTER
Discussion with patient.  Latanoprost causes drainage and sore mouth/tongue.  Uses NLD occlusion.  Has stopped and restarted; symptoms resolved and recurred.  Tried Brimonidine twice daily but burned x 1 hour.  Recommend try Brimonidine again, but use artificial tear 5 minutes after.  Could consider Timolol or selective laser trabeculoplasty.

## 2021-03-04 ENCOUNTER — TELEPHONE (OUTPATIENT)
Dept: OPHTHALMOLOGY | Facility: CLINIC | Age: 76
End: 2021-03-04

## 2021-03-04 NOTE — TELEPHONE ENCOUNTER
"Patient called to indicate that she is having difficulty with RX \"Brimonidine\". She is having drainage and sinus issues. Please call to discuss alternatives. Phone: [phone].  "

## 2021-03-05 ENCOUNTER — TELEPHONE (OUTPATIENT)
Dept: OPHTHALMOLOGY | Facility: CLINIC | Age: 76
End: 2021-03-05

## 2021-03-05 NOTE — TELEPHONE ENCOUNTER
Discussed with patient.  Again, had symptoms of burning and drainage with Brimonidine; resolved when stopped drops one week ago.  Had CT of sinuses which apparently showed no significant infection.  Recommend no drops until I see her in about 3 weeks.  Will reassess then.  Dhruv Estrella M.D.  [phone]

## 2021-03-05 NOTE — TELEPHONE ENCOUNTER
Patient calling with concerns of Brimonidine and her sinus, drainage issues. Will forward to Dr. Estrella to address concerns.

## 2021-04-06 ENCOUNTER — OFFICE VISIT (OUTPATIENT)
Dept: OPHTHALMOLOGY | Facility: CLINIC | Age: 76
End: 2021-04-06
Payer: COMMERCIAL

## 2021-04-06 DIAGNOSIS — H40.052 OCULAR HYPERTENSION OF LEFT EYE: Primary | ICD-10-CM

## 2021-04-06 PROCEDURE — 92133 CPTRZD OPH DX IMG PST SGM ON: CPT | Performed by: OPHTHALMOLOGY

## 2021-04-06 PROCEDURE — 92012 INTRM OPH EXAM EST PATIENT: CPT | Performed by: OPHTHALMOLOGY

## 2021-04-06 PROCEDURE — 92083 EXTENDED VISUAL FIELD XM: CPT | Performed by: OPHTHALMOLOGY

## 2021-04-06 ASSESSMENT — VISUAL ACUITY
METHOD: SNELLEN - LINEAR
OD_CC: 20/20
OS_CC: 20/100

## 2021-04-06 ASSESSMENT — TONOMETRY
IOP_METHOD: APPLANATION
OS_IOP_MMHG: 20
OD_IOP_MMHG: 13

## 2021-04-06 NOTE — PATIENT INSTRUCTIONS
We will observe intraocular pressure for now without treatment.  Cataract surgery is an option at anytime in the left eye; call to schedule if desire [phone].  Otherwise return visit October 2021 for a complete exam.  Dhruv Estrella M.D.  [phone]

## 2021-04-06 NOTE — PROGRESS NOTES
Current Eye Medications:  none     Subjective:  6 mo iop.  Pt states that the latanoprost and brimonidine she was taking was causing significant sinus problesm so dced drops per Dr. Estrella's recommendation, sinus problems stopped after stopped the drops.    RH.     Objective:  See Ophthalmology Exam.       Assessment:  Intraocular pressure higher but okay off treatment.  Glaucoma OCT and Jennings Visual Field stable both eyes.  Retinal OCT also within normal limits both eyes.       Plan:  We will observe intraocular pressure for now without treatment.  Encouraged to get Covid vaccine.  Cataract surgery is an option at anytime in the left eye; call to schedule if desire [phone].  Otherwise return visit October 2021 for a complete exam.  Dhruv Estrella M.D.  [phone]

## 2021-04-06 NOTE — LETTER
4/6/2021         RE: Sharmin Shipley  [address] [facility] [address]        Dear Colleague,    Thank you for referring your patient, Sharmin Shipley, to the [facility]. Please see a copy of my visit note below.     Current Eye Medications:  none     Subjective:  6 mo iop.  Pt states that the latanoprost and brimonidine she was taking was causing significant sinus problesm so dced drops per Dr. Estrella's recommendation, sinus problems stopped after stopped the drops.    RH.     Objective:  See Ophthalmology Exam.       Assessment:  Intraocular pressure higher but okay off treatment.  Glaucoma OCT and Jennings Visual Field stable both eyes.  Retinal OCT also within normal limits both eyes.       Plan:  We will observe intraocular pressure for now without treatment.  Encouraged to get Covid vaccine.  Cataract surgery is an option at anytime in the left eye; call to schedule if desire [phone].  Otherwise return visit October 2021 for a complete exam.  Dhruv Estrella M.D.  [phone]           Again, thank you for allowing me to participate in the care of your patient.        Sincerely,        Dhruv Estrella MD

## 2021-04-08 ASSESSMENT — PACHYMETRY
OD_CT(UM): .506
OS_CT(UM): .489

## 2021-04-08 ASSESSMENT — EXTERNAL EXAM - RIGHT EYE: OD_EXAM: MILD-MOD BROW

## 2021-04-08 ASSESSMENT — EXTERNAL EXAM - LEFT EYE: OS_EXAM: MILD-MOD BROW

## 2021-04-11 ENCOUNTER — HEALTH MAINTENANCE LETTER (OUTPATIENT)
Age: 76
End: 2021-04-11

## 2021-05-29 ENCOUNTER — RECORDS - HEALTHEAST (OUTPATIENT)
Dept: ADMINISTRATIVE | Facility: CLINIC | Age: 76
End: 2021-05-29

## 2021-06-17 ENCOUNTER — TELEPHONE (OUTPATIENT)
Dept: OPHTHALMOLOGY | Facility: CLINIC | Age: 76
End: 2021-06-17

## 2021-06-17 NOTE — TELEPHONE ENCOUNTER
Pt called to ask if she needs to be seen before Dr. Estrella's recommended appt this Oct. for a complete eye exam.   Pt reports there is nothing new going on with her eyes other than her eyes feel much better since Dr. Estrella told her to stop her bromonidine drops because she was allergic to it. I told pt if her eyes seem the same to her and she continues not to want cataract surgery  Left eye she does not need to come in before her Oct. appt.

## 2021-09-26 ENCOUNTER — HEALTH MAINTENANCE LETTER (OUTPATIENT)
Age: 76
End: 2021-09-26

## 2022-04-18 ENCOUNTER — OFFICE VISIT (OUTPATIENT)
Dept: OPHTHALMOLOGY | Facility: CLINIC | Age: 77
End: 2022-04-18
Payer: COMMERCIAL

## 2022-04-18 DIAGNOSIS — H25.812 COMBINED FORMS OF AGE-RELATED CATARACT OF LEFT EYE: Primary | ICD-10-CM

## 2022-04-18 DIAGNOSIS — H52.4 PRESBYOPIA: ICD-10-CM

## 2022-04-18 DIAGNOSIS — H40.052 OCULAR HYPERTENSION OF LEFT EYE: ICD-10-CM

## 2022-04-18 DIAGNOSIS — H43.813 POSTERIOR VITREOUS DETACHMENT, BILATERAL: ICD-10-CM

## 2022-04-18 DIAGNOSIS — Z01.01 ENCOUNTER FOR EXAMINATION OF EYES AND VISION WITH ABNORMAL FINDINGS: ICD-10-CM

## 2022-04-18 DIAGNOSIS — H25.811 COMBINED FORMS OF AGE-RELATED CATARACT OF RIGHT EYE: ICD-10-CM

## 2022-04-18 DIAGNOSIS — H26.8 PSEUDOEXFOLIATION SYNDROME: ICD-10-CM

## 2022-04-18 PROCEDURE — 92014 COMPRE OPH EXAM EST PT 1/>: CPT | Performed by: OPHTHALMOLOGY

## 2022-04-18 PROCEDURE — 92015 DETERMINE REFRACTIVE STATE: CPT | Performed by: OPHTHALMOLOGY

## 2022-04-18 RX ORDER — LATANOPROST 50 UG/ML
1 SOLUTION/ DROPS OPHTHALMIC EVERY MORNING
Qty: 7.5 ML | Refills: 4 | Status: SHIPPED | OUTPATIENT
Start: 2022-04-18 | End: 2022-10-18

## 2022-04-18 ASSESSMENT — REFRACTION_MANIFEST
OD_SPHERE: PLANO
OD_AXIS: 015
OS_CYLINDER: +1.00
OS_ADD: +3.00
OS_SPHERE: -2.50
OS_AXIS: 072
OD_ADD: +3.00
OD_CYLINDER: +1.00

## 2022-04-18 ASSESSMENT — VISUAL ACUITY
METHOD: SNELLEN - LINEAR
OS_PH_CC: 20/40
OD_CC: J2
OD_CC+: -1
CORRECTION_TYPE: GLASSES
OD_PH_CC: 20/30
OS_CC: J1
OS_PH_CC+: -1
OD_PH_CC+: -2
OS_CC: 20/200
OD_CC: 20/60

## 2022-04-18 ASSESSMENT — CUP TO DISC RATIO
OD_RATIO: 0.4
OS_RATIO: 0.5

## 2022-04-18 ASSESSMENT — TONOMETRY
OD_IOP_MMHG: 19
OS_IOP_MMHG: 24
IOP_METHOD: ICARE

## 2022-04-18 ASSESSMENT — REFRACTION_WEARINGRX
OS_SPHERE: +0.50
SPECS_TYPE: BIFOCAL
OD_CYLINDER: +1.50
OD_ADD: +3.50
OS_AXIS: 074
OS_ADD: +3.50
OS_CYLINDER: +1.25
OD_SPHERE: +0.50
OD_AXIS: 015

## 2022-04-18 ASSESSMENT — EXTERNAL EXAM - RIGHT EYE: OD_EXAM: MILD-MOD BROW

## 2022-04-18 ASSESSMENT — EXTERNAL EXAM - LEFT EYE: OS_EXAM: MILD-MOD BROW

## 2022-04-18 ASSESSMENT — CONF VISUAL FIELD
OD_NORMAL: 1
OS_NORMAL: 1

## 2022-04-18 NOTE — PATIENT INSTRUCTIONS
Glasses Rx given - optional    May use artificial tears up to 4 times daily both eyes. (Refresh Tears, Systane Ultra/Balance, or Theratears)   Possible posterior vitreous detachment (sudden onset large floater and/or flashing lights) right eye discussed.   We will schedule cataract surgery for your left eye than your right eye with Dr. Galindo.  We will contact you with dates and appointments.   Start Latanoprost drop both eyes every morning.  Dhruv Estrella M.D.  [phone]

## 2022-04-18 NOTE — PROGRESS NOTES
Current Eye Medications:  none     Subjective:  Complete exam: patient states she feels her distance visual acuity and near visual acuity is getting worse. She will occasionally go without her glasses.   As per Dr. Estrella's instructions, she is not on any glaucoma medications.  Thinks her symptoms are improved but feels they may be due to using the drops at bedtime; open to trying them in the morning.   Patient states she has some reactions to the drops for checking her pressure (Fluress).    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     Objective:  See Ophthalmology Exam.       Assessment:  Intraocular pressure up both eyes off Latanoprost.  Cataracts remain visually significant left eye > right eye.  Otherwise stable eye exam.       ICD-10-CM    1. Combined forms of age-related cataract, mod, of left eye  H25.812    2. Combined forms of age-related cataract, mod, of right eye  H25.811    3. 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 Ocular hypertension of left eye - treated  H40.052 REFRACTIVE STATUS     EYE EXAM (SIMPLE-NONBILLABLE)     latanoprost (XALATAN) 0.005 % ophthalmic solution   5. Posterior vitreous detachment, bilateral  H43.813    6. Encounter for examination of eyes and vision with abnormal findings  Z01.01    7. Presbyopia  H52.4         Plan:  Glasses Rx given - optional    May use artificial tears up to 4 times daily both eyes. (Refresh Tears, Systane Ultra/Balance, or Theratears)   Possible posterior vitreous detachment (sudden onset large floater and/or flashing lights) right eye discussed.   We will arrange cataract surgery for you in the near future, left eye then right eye.  Start Latanoprost drop both eyes every morning.  Will hold on intraocular pressure recheck as was well controlled in past on Latanoprost.  Dhruv Estrella M.D.  [phone]

## 2022-04-18 NOTE — LETTER
4/18/2022         RE: Sharmin Shipley  [address] [facility] [address]        Dear Colleague,    Thank you for referring your patient, Sharmin Shipley, to the [facility]. Please see a copy of my visit note below.     Current Eye Medications:  none     Subjective:  Complete exam: patient states she feels her distance visual acuity and near visual acuity is getting worse. She will occasionally go without her glasses.   As per Dr. Estrella's instructions, she is not on any glaucoma medications.  Thinks her symptoms are improved but feels they may be due to using the drops at bedtime; open to trying them in the morning.   Patient states she has some reactions to the drops for checking her pressure (Fluress).    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     Objective:  See Ophthalmology Exam.       Assessment:  Intraocular pressure up both eyes off Latanoprost.  Cataracts remain visually significant left eye > right eye.  Otherwise stable eye exam.       ICD-10-CM    1. Combined forms of age-related cataract, mod, of left eye  H25.812    2. Combined forms of age-related cataract, mod, of right eye  H25.811    3. 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 Ocular hypertension of left eye - treated  H40.052 REFRACTIVE STATUS     EYE EXAM (SIMPLE-NONBILLABLE)     latanoprost (XALATAN) 0.005 % ophthalmic solution   5. Posterior vitreous detachment, bilateral  H43.813    6. Encounter for examination of eyes and vision with abnormal findings  Z01.01    7. Presbyopia  H52.4         Plan:  Glasses Rx given - optional    May use artificial tears up to 4 times daily both eyes. (Refresh Tears, Systane Ultra/Balance, or Theratears)   Possible posterior vitreous detachment (sudden onset large floater and/or flashing lights) right eye discussed.   We will arrange cataract surgery for you in the near future, left eye then right eye.  Start Latanoprost drop both eyes  every morning.  Will hold on intraocular pressure recheck as was well controlled in past on Latanoprost.  Dhruv Estrella M.D.  [phone]          Again, thank you for allowing me to participate in the care of your patient.        Sincerely,        Dhruv Estrella MD

## 2022-05-07 ENCOUNTER — HEALTH MAINTENANCE LETTER (OUTPATIENT)
Age: 77
End: 2022-05-07

## 2022-05-19 ENCOUNTER — TELEPHONE (OUTPATIENT)
Dept: OPHTHALMOLOGY | Facility: CLINIC | Age: 77
End: 2022-05-19
Payer: COMMERCIAL

## 2022-05-19 NOTE — TELEPHONE ENCOUNTER
Spoke with patient - she developed head congestion, and sinus drainage shortly after starting Latanoprost (she had this same issue in the past when she used Latanoprost and also when she used Brimonidine).  She regularly has sinus problems, and is unsure if this is a coincidence, or if related to the drops.  She discontinued Latanoprost one week ago, and the drainage has slowly improved.  She wonders if Dr. Estrella would like her to try Latanoprost again, or if there is something else she should use.    I explained to her that Dr. Estrella would be back in the office tomorrow.  He will review her message, and we will call her back with his recommendations.

## 2022-05-19 NOTE — TELEPHONE ENCOUNTER
Patient tried the Latanoprost, and the patient gets drainage. She is asking for a return phone call to discuss options. Phone: [phone].

## 2022-05-26 NOTE — TELEPHONE ENCOUNTER
Discussed with patient.  She is willing to try Latanoprost again.  Will use NLS occlusion for 2 minutes after instilling drop and use an artificial tear after 5 minutes.  Will contact us if still problems.  Still recuperating from abdominal surgery.  Would like to wait on cataract evaluation until August.  Will schedule with Dr. Odell at [facility].  She is agreeable to this plan.  Dhruv Estrella M.D.

## 2022-07-05 ENCOUNTER — TELEPHONE (OUTPATIENT)
Dept: OPHTHALMOLOGY | Facility: CLINIC | Age: 77
End: 2022-07-05

## 2022-07-05 NOTE — TELEPHONE ENCOUNTER
Patient stated she recently tried Latanoprost and it has made her eyes puffy. She is wondering if she should continue this or if she should try something else to see if there is an alternative.    She would like a call back as soon as possible to discuss at: [phone]    Thank You,  Rony Khan  Patient Rep

## 2022-07-05 NOTE — TELEPHONE ENCOUNTER
Seen by Dr Estrella 4/18/2022 - calling pt to get more information. Left voice message to call us back.

## 2022-07-06 ENCOUNTER — TELEPHONE (OUTPATIENT)
Dept: OPHTHALMOLOGY | Facility: CLINIC | Age: 77
End: 2022-07-06

## 2022-07-06 DIAGNOSIS — H40.052 OCULAR HYPERTENSION OF LEFT EYE: Primary | ICD-10-CM

## 2022-07-06 DIAGNOSIS — H26.8 PSEUDOEXFOLIATION SYNDROME: ICD-10-CM

## 2022-07-06 NOTE — TELEPHONE ENCOUNTER
Tried patient again three more times. No answer. Left her a message to call back with her questions.

## 2022-07-06 NOTE — TELEPHONE ENCOUNTER
Patient called back stating that she wants a call back. She missed Madeline's call yesterday. Patient stated she was using Latanoprost. She would like a callback at: [phone].    Thank You,    Rony Khan  Patient Rep

## 2022-07-06 NOTE — TELEPHONE ENCOUNTER
Patient was attempting to reach Esha who had returned her call. Please call patient back at [phone].

## 2022-07-07 RX ORDER — TIMOLOL MALEATE 5 MG/ML
1 SOLUTION/ DROPS OPHTHALMIC DAILY
Qty: 15 ML | Refills: 3 | Status: SHIPPED | OUTPATIENT
Start: 2022-07-07 | End: 2022-10-18

## 2022-07-07 NOTE — TELEPHONE ENCOUNTER
Patient thinks she is allergic to Latanoprost. Doing the punctual occlusion and five minutes later the artificial tears to both eyes. Still swollen and red after instilling the drops. Per Dr. Estrella, she is to try Timolol 0.05% one drop both eyes every morning. Then punctal occlusion and one tear drop. She will try this and let us know how she does. Sent to Dr. Estrella to sign and send.

## 2022-07-19 ENCOUNTER — TELEPHONE (OUTPATIENT)
Dept: OPHTHALMOLOGY | Facility: CLINIC | Age: 77
End: 2022-07-19

## 2022-07-19 NOTE — TELEPHONE ENCOUNTER
Dr. Estrella recommends she remain off Timolol until her appointment with Dr. Odell.  I informed the patient.  
Patient is requesting a return phone call to discuss RX Timolol. She has spoken to Esha previously. Phone: [phone].  
Spoke with patient - she used timolol for two days.  On the 3rd day, she woke with sinus pressure, headache-like-feeling, and soreness of both eyes.  No redness, mattering, itching, or watering.  She discontinued timolol on 7-17-22.  She is currently using artificial tears 3-4 times a day.  She is wondering if she should remain off the drops until her appointment with Dr. Odell at the [facility] on 8-8-22, or what Dr. Estrella recommends as the next step.  I will review her message with Dr. Estrella, and call her back this afternoon.  
normal...

## 2022-08-04 ENCOUNTER — OFFICE VISIT (OUTPATIENT)
Dept: OPHTHALMOLOGY | Facility: CLINIC | Age: 77
End: 2022-08-04
Attending: OPHTHALMOLOGY
Payer: COMMERCIAL

## 2022-08-04 DIAGNOSIS — H25.811 COMBINED FORMS OF AGE-RELATED CATARACT OF RIGHT EYE: ICD-10-CM

## 2022-08-04 DIAGNOSIS — H26.8 PSEUDOEXFOLIATION OF LENS CAPSULE: ICD-10-CM

## 2022-08-04 DIAGNOSIS — H25.812 COMBINED FORMS OF AGE-RELATED CATARACT OF LEFT EYE: ICD-10-CM

## 2022-08-04 DIAGNOSIS — H40.1131 PRIMARY OPEN ANGLE GLAUCOMA (POAG) OF BOTH EYES, MILD STAGE: Primary | ICD-10-CM

## 2022-08-04 PROCEDURE — G0463 HOSPITAL OUTPT CLINIC VISIT: HCPCS | Mod: 25

## 2022-08-04 PROCEDURE — 99214 OFFICE O/P EST MOD 30 MIN: CPT | Performed by: OPHTHALMOLOGY

## 2022-08-04 PROCEDURE — 92015 DETERMINE REFRACTIVE STATE: CPT

## 2022-08-04 PROCEDURE — 76519 ECHO EXAM OF EYE: CPT | Performed by: OPHTHALMOLOGY

## 2022-08-04 PROCEDURE — 92025 CPTRIZED CORNEAL TOPOGRAPHY: CPT | Performed by: OPHTHALMOLOGY

## 2022-08-04 RX ORDER — LORAZEPAM 0.5 MG/1
TABLET ORAL
COMMUNITY
Start: 2022-05-06

## 2022-08-04 RX ORDER — ALBUTEROL SULFATE 0.83 MG/ML
SOLUTION RESPIRATORY (INHALATION)
COMMUNITY
Start: 2021-11-17

## 2022-08-04 ASSESSMENT — REFRACTION_MANIFEST
OS_CYLINDER: +1.25
OS_AXIS: 070
OD_AXIS: 020
OD_SPHERE: PLANO
OD_CYLINDER: +1.00
OS_SPHERE: -2.50
OS_ADD: +3.00
OD_ADD: +3.00

## 2022-08-04 ASSESSMENT — TONOMETRY
IOP_METHOD: TONOPEN
OD_IOP_MMHG: 18
OS_IOP_MMHG: 25

## 2022-08-04 ASSESSMENT — VISUAL ACUITY
METHOD: SNELLEN - LINEAR
OD_CC+: +2
OS_CC: 20/200
CORRECTION_TYPE: GLASSES
METHOD_MR_RETINOSCOPY: 1
OD_PH_CC+: -2
OD_PH_CC: 20/25
OS_PH_CC: 20/40
OD_CC: 20/30

## 2022-08-04 ASSESSMENT — CUP TO DISC RATIO
OS_RATIO: 0.5
OD_RATIO: 0.4

## 2022-08-04 ASSESSMENT — EXTERNAL EXAM - LEFT EYE: OS_EXAM: MILD-MOD BROW

## 2022-08-04 ASSESSMENT — REFRACTION_WEARINGRX
OD_CYLINDER: +1.50
OD_SPHERE: +0.50
OS_SPHERE: +0.50
OD_AXIS: 015
OD_ADD: +3.50
OS_CYLINDER: +1.25
SPECS_TYPE: BIFOCAL
OS_AXIS: 074
OS_ADD: +3.50

## 2022-08-04 ASSESSMENT — CONF VISUAL FIELD
OS_NORMAL: 1
METHOD: COUNTING FINGERS

## 2022-08-04 ASSESSMENT — EXTERNAL EXAM - RIGHT EYE: OD_EXAM: MILD-MOD BROW

## 2022-08-04 NOTE — PROGRESS NOTES
Chief Complaint(s) and History of Present Illness(es)     Cataract Evaluation               Comments     Patient states that her vision is more blurry since she saw Dr. Estrella   in 4/2022. No pain and irritation. No flashes of lights. No floaters. She   states that when she drive at night she notices glare and haloes. She   states that she does not drive at night anymore.     Ocular Meds: none (suppose to be taking latanoprost BE)     Lokesh Dickeyotoniel MANSFIELD, August 4, 2022 10:19 AM                  Review of systems for the eyes was negative other than the pertinent positives/negatives listed in the HPI.      Assessment & Plan      Sharmin Shipley is a 76 year old female with the following diagnoses:   1. Primary open angle glaucoma (POAG) of both eyes, mild stage    2. Combined forms of age-related cataract, mod, of left eye    3. Combined forms of age-related cataract, mod, of right eye    4. Pseudoexfoliation            Referral from Dr. Delgado for cataract and glaucoma  Intolerant to drops for intraocular pressure (sinus congestion)  OCT Nerve fiber layer reviewed and interpreted from outside.  Health rim, stable     Cataract, left eye > right eye   Visually significant both eyes   Risks, benefits and alternatives to cataract extraction/IOL implantation + iStent discussed; consent obtained.  Will schedule surgery today    Special equipment/needs:    Anesthesia:Topical  Dilation:Poor Dilation  Iris expansion: Possible Malyugin right eye   Pseudoexfoliation: Pseudoexfoliation  Trypan Blue: No   Visually significant astigmatism left eye   Discussed surgical corrective options  Reviewed elective nature of surgical correction and patient responsible fee associated  The patient will consider toric IOL left eye and call with decision (?R)  Goal emmetropia  Dex         Attending Physician Attestation:  Complete documentation of historical and exam elements from today's encounter can be found in the full encounter summary  report (not reduplicated in this progress note).  I personally obtained the chief complaint(s) and history of present illness.  I confirmed and edited as necessary the review of systems, past medical/surgical history, family history, social history, and examination findings as documented by others; and I examined the patient myself.  I personally reviewed the relevant tests, images, and reports as documented above.  I formulated and edited as necessary the assessment and plan and discussed the findings and management plan with the patient and family. Today with Sharmin Shipley  and her daughter, I reviewed the indications, risks, benefits, and alternatives of the proposed surgical procedure including, but not limited to, failure obtain the desired result  and need for additional surgery, bleeding, infection, loss of vision, loss of the eye, and the remote possibility of permanent damage to any organ system or death with the use of anesthesia.  I provided multiple opportunities for the questions, answered all questions to the best of my ability, and confirmed that my answers and my discussion were understood.      . - Yakov Odell MD

## 2022-08-04 NOTE — NURSING NOTE
Chief Complaints and History of Present Illnesses   Patient presents with     Cataract Evaluation     Chief Complaint(s) and History of Present Illness(es)     Cataract Evaluation               Comments     Patient states that her vision is more blurry since she saw Dr. Estrella in 4/2022. No pain and irritation. No flashes of lights. No floaters. She states that when she drive at night she notices glare and haloes. She states that she does not drive at night anymore.     Ocular Meds: none (suppose to be taking latanoprost BE)     Lokesh MANSFIELD, August 4, 2022 10:19 AM

## 2022-08-29 ENCOUNTER — TELEPHONE (OUTPATIENT)
Dept: OPHTHALMOLOGY | Facility: CLINIC | Age: 77
End: 2022-08-29

## 2022-08-29 PROBLEM — H40.1131 PRIMARY OPEN ANGLE GLAUCOMA (POAG) OF BOTH EYES, MILD STAGE: Status: ACTIVE | Noted: 2022-08-29

## 2022-08-29 NOTE — TELEPHONE ENCOUNTER
Called patient to schedule surgery with Dr. Odell    Date of Surgery: 10/31,11/14    Location of surgery: [facility]    Pre-Op H&P: PCP    Pre/Post Imaging:  No    Discussed COVID-19 Testing: Yes    Post-Op Appt Date: 12/1    Surgery Packet Mailed: yes      Additional comments: Spoke with patient they are aware of above dates, will review packet and call with any questions           Anna C. Schoenecker on 8/29/2022 at 3:42 PM

## 2022-09-27 ENCOUNTER — TELEPHONE (OUTPATIENT)
Dept: OPHTHALMOLOGY | Facility: CLINIC | Age: 77
End: 2022-09-27

## 2022-09-27 NOTE — TELEPHONE ENCOUNTER
Health Call Center    Phone Message    May a detailed message be left on voicemail: yes     Reason for Call: Other: Pt called because she has a hard lump on the bridge of her nose where the pad pf her glasses rests. Writer suggested going to PCP and pt states that she has. PCP recommended hot packs and there has been no change. Wants confirmation from Dr. Estrella that this lump on her nose is not going to interfere with her ability to have Surgery with Dr. Odell for cataracts. Please call pt to discuss. Thank you.       Action Taken: Message routed to:  [facility] EYE    Travel Screening: Not Applicable

## 2022-09-27 NOTE — TELEPHONE ENCOUNTER
Called patient back. I recommended that she have glasses adjusted or nose pads replaced by optical shop and that the bump should not interfere with cataract surgery.

## 2022-09-28 ENCOUNTER — TELEPHONE (OUTPATIENT)
Dept: OPHTHALMOLOGY | Facility: CLINIC | Age: 77
End: 2022-09-28

## 2022-09-28 NOTE — TELEPHONE ENCOUNTER
"Returned call to patient to answer some questions. She needed codes for her surgery to provide to her insurance, phone numbers were given to the proper departments. She also wanted to discloses that she has \"White coat syndrome\"  And it is not unusual for her blood pressure to be very high upon arrival. She is worried that this could impact her surgery.    Anna C. Schoenecker on 9/28/2022 at 8:04 AM    "

## 2022-10-18 ENCOUNTER — TELEPHONE (OUTPATIENT)
Dept: OPHTHALMOLOGY | Facility: CLINIC | Age: 77
End: 2022-10-18

## 2022-10-18 RX ORDER — FAMOTIDINE 20 MG/1
20 TABLET, FILM COATED ORAL 2 TIMES DAILY
COMMUNITY

## 2022-10-18 NOTE — TELEPHONE ENCOUNTER
Spoke to pt at [phone]    Updated med list and list timolol, brimonidine, latanoprost as allergy/hypersensitivity as pt has cough and sinus congestion with use.    Reviewed no need to go off Plavix prior to surgery-- topical anesthetic    Reviewed no pre-op drops needed prior to surgery     Pt verbally demonstrated understanding    Shay Judge RN 3:21 PM 10/18/22    ---      [phone] --Sharmin Shipley [phone]     Left message with direct number at [phone]    Shay Judge RN 2:45 PM 10/18/22    [facility] Call Center    Phone Message    May a detailed message be left on voicemail: yes     Reason for Call: Other: Pt called and would like to update her medication list on her mychart prior to her 10/31 surgery with Dr. Odell. She is unablet o change it on her mychart and said that there is a lot on there that she no longer takes. Please contact pt to discuss     Thank you      Action Taken: Eye  Travel Screening: Not Applicable

## 2022-10-27 ENCOUNTER — LAB (OUTPATIENT)
Dept: LAB | Facility: CLINIC | Age: 77
End: 2022-10-27
Payer: COMMERCIAL

## 2022-10-27 DIAGNOSIS — Z20.822 ENCOUNTER FOR LABORATORY TESTING FOR COVID-19 VIRUS: ICD-10-CM

## 2022-10-27 PROCEDURE — U0005 INFEC AGEN DETEC AMPLI PROBE: HCPCS

## 2022-10-27 PROCEDURE — U0003 INFECTIOUS AGENT DETECTION BY NUCLEIC ACID (DNA OR RNA); SEVERE ACUTE RESPIRATORY SYNDROME CORONAVIRUS 2 (SARS-COV-2) (CORONAVIRUS DISEASE [COVID-19]), AMPLIFIED PROBE TECHNIQUE, MAKING USE OF HIGH THROUGHPUT TECHNOLOGIES AS DESCRIBED BY CMS-2020-01-R: HCPCS

## 2022-10-28 ENCOUNTER — ANESTHESIA EVENT (OUTPATIENT)
Dept: SURGERY | Facility: AMBULATORY SURGERY CENTER | Age: 77
End: 2022-10-28
Payer: COMMERCIAL

## 2022-10-28 LAB — SARS-COV-2 RNA RESP QL NAA+PROBE: NEGATIVE

## 2022-10-30 NOTE — PROGRESS NOTES
POD#0, status post cataract surgery + iStent, LEFT eye     Impression/Plan:  Pseudophakia, LEFT EYE: POD0, good post-operative appearance. IOP reasonable.     Eye protection at all times and eye shield at night for 1 week.     Limited activities with no exercise or heavy lifting for 1 week.     Instructed patient to contact us for decreasing vision, eye pain, new floaters or flashes of light or other concerning symptoms.     Written instructions given    Drops:  Ofloxacin QID for 7 days in operative eye  Prednisolone 4/3/2/1 taper every 7 days in operative eye  Continue off glaucoma drops    Follow up scheduled on 11/14/22 right eye CEIOL    All questions were answered    Zuri Cortez MD  Ophthalmology Resident, PGY-4  [facility]     Attending Physician Attestation:  Complete documentation of historical and exam elements from today's encounter can be found in the full encounter summary report (not reduplicated in this progress note).  I personally obtained the chief complaint(s) and history of present illness.  I confirmed and edited as necessary the review of systems, past medical/surgical history, family history, social history, and examination findings as documented by others; and I examined the patient myself.  I personally reviewed the relevant tests, images, and reports as documented above.  I formulated and edited as necessary the assessment and plan and discussed the findings and management plan with the patient and family. . - Yakov Odell MD

## 2022-10-31 ENCOUNTER — ANESTHESIA (OUTPATIENT)
Dept: SURGERY | Facility: AMBULATORY SURGERY CENTER | Age: 77
End: 2022-10-31
Payer: COMMERCIAL

## 2022-10-31 ENCOUNTER — OFFICE VISIT (OUTPATIENT)
Dept: OPHTHALMOLOGY | Facility: CLINIC | Age: 77
End: 2022-10-31

## 2022-10-31 ENCOUNTER — HOSPITAL ENCOUNTER (OUTPATIENT)
Facility: AMBULATORY SURGERY CENTER | Age: 77
Discharge: HOME | End: 2022-10-31
Attending: OPHTHALMOLOGY
Payer: COMMERCIAL

## 2022-10-31 VITALS
WEIGHT: 103.62 LBS | RESPIRATION RATE: 18 BRPM | SYSTOLIC BLOOD PRESSURE: 184 MMHG | OXYGEN SATURATION: 98 % | DIASTOLIC BLOOD PRESSURE: 74 MMHG | TEMPERATURE: 97.4 F

## 2022-10-31 DIAGNOSIS — H25.812 COMBINED FORMS OF AGE-RELATED CATARACT OF LEFT EYE: Primary | ICD-10-CM

## 2022-10-31 DIAGNOSIS — Z98.890 POSTOPERATIVE EYE STATE: Primary | ICD-10-CM

## 2022-10-31 DIAGNOSIS — H40.1131 PRIMARY OPEN ANGLE GLAUCOMA (POAG) OF BOTH EYES, MILD STAGE: ICD-10-CM

## 2022-10-31 PROCEDURE — C1783 OCULAR IMP, AQUEOUS DRAIN DE: HCPCS

## 2022-10-31 PROCEDURE — 99024 POSTOP FOLLOW-UP VISIT: CPT | Mod: GC | Performed by: OPHTHALMOLOGY

## 2022-10-31 PROCEDURE — 66989 XCPSL CTRC RMVL CPLX INSJ 1+: CPT | Mod: LT

## 2022-10-31 PROCEDURE — 66989 XCPSL CTRC RMVL CPLX INSJ 1+: CPT | Mod: LT | Performed by: OPHTHALMOLOGY

## 2022-10-31 DEVICE — EYE IMP ISTENT TRABECULAR MICRO-BYPASS LT GTS100L: Type: IMPLANTABLE DEVICE | Site: EYE | Status: FUNCTIONAL

## 2022-10-31 DEVICE — EYE IMP IOL ALCON ACRYSOF UV SA60WF 23.0D: Type: IMPLANTABLE DEVICE | Site: EYE | Status: FUNCTIONAL

## 2022-10-31 RX ORDER — TIMOLOL MALEATE 5 MG/ML
SOLUTION/ DROPS OPHTHALMIC DAILY PRN
Status: DISCONTINUED | OUTPATIENT
Start: 2022-10-31 | End: 2022-10-31 | Stop reason: HOSPADM

## 2022-10-31 RX ORDER — LIDOCAINE 40 MG/G
CREAM TOPICAL
Status: DISCONTINUED | OUTPATIENT
Start: 2022-10-31 | End: 2022-10-31 | Stop reason: HOSPADM

## 2022-10-31 RX ORDER — LIDOCAINE HYDROCHLORIDE 10 MG/ML
INJECTION, SOLUTION EPIDURAL; INFILTRATION; INTRACAUDAL; PERINEURAL DAILY PRN
Status: DISCONTINUED | OUTPATIENT
Start: 2022-10-31 | End: 2022-10-31 | Stop reason: HOSPADM

## 2022-10-31 RX ORDER — HYDROMORPHONE HYDROCHLORIDE 1 MG/ML
0.2 INJECTION, SOLUTION INTRAMUSCULAR; INTRAVENOUS; SUBCUTANEOUS EVERY 5 MIN PRN
Status: DISCONTINUED | OUTPATIENT
Start: 2022-10-31 | End: 2022-10-31 | Stop reason: HOSPADM

## 2022-10-31 RX ORDER — FENTANYL CITRATE 50 UG/ML
25 INJECTION, SOLUTION INTRAMUSCULAR; INTRAVENOUS
Status: DISCONTINUED | OUTPATIENT
Start: 2022-10-31 | End: 2022-11-01 | Stop reason: HOSPADM

## 2022-10-31 RX ORDER — SODIUM CHLORIDE, SODIUM LACTATE, POTASSIUM CHLORIDE, CALCIUM CHLORIDE 600; 310; 30; 20 MG/100ML; MG/100ML; MG/100ML; MG/100ML
INJECTION, SOLUTION INTRAVENOUS CONTINUOUS
Status: DISCONTINUED | OUTPATIENT
Start: 2022-10-31 | End: 2022-10-31 | Stop reason: HOSPADM

## 2022-10-31 RX ORDER — SODIUM CHLORIDE, SODIUM LACTATE, POTASSIUM CHLORIDE, CALCIUM CHLORIDE 600; 310; 30; 20 MG/100ML; MG/100ML; MG/100ML; MG/100ML
INJECTION, SOLUTION INTRAVENOUS CONTINUOUS
Status: DISCONTINUED | OUTPATIENT
Start: 2022-10-31 | End: 2022-11-01 | Stop reason: HOSPADM

## 2022-10-31 RX ORDER — MOXIFLOXACIN IN NACL,ISO-OS/PF 0.3MG/0.3
SYRINGE (ML) INTRAOCULAR DAILY PRN
Status: DISCONTINUED | OUTPATIENT
Start: 2022-10-31 | End: 2022-10-31 | Stop reason: HOSPADM

## 2022-10-31 RX ORDER — TETRACAINE HYDROCHLORIDE 5 MG/ML
SOLUTION OPHTHALMIC DAILY PRN
Status: DISCONTINUED | OUTPATIENT
Start: 2022-10-31 | End: 2022-10-31 | Stop reason: HOSPADM

## 2022-10-31 RX ORDER — OXYCODONE HYDROCHLORIDE 5 MG/1
5 TABLET ORAL EVERY 4 HOURS PRN
Status: DISCONTINUED | OUTPATIENT
Start: 2022-10-31 | End: 2022-11-01 | Stop reason: HOSPADM

## 2022-10-31 RX ORDER — KETOROLAC TROMETHAMINE 4 MG/ML
1 SOLUTION/ DROPS OPHTHALMIC 4 TIMES DAILY
Qty: 5 ML | Refills: 1 | Status: SHIPPED | OUTPATIENT
Start: 2022-10-31 | End: 2023-08-21

## 2022-10-31 RX ORDER — FENTANYL CITRATE 50 UG/ML
25 INJECTION, SOLUTION INTRAMUSCULAR; INTRAVENOUS EVERY 5 MIN PRN
Status: DISCONTINUED | OUTPATIENT
Start: 2022-10-31 | End: 2022-10-31 | Stop reason: HOSPADM

## 2022-10-31 RX ORDER — BALANCED SALT SOLUTION 6.4; .75; .48; .3; 3.9; 1.7 MG/ML; MG/ML; MG/ML; MG/ML; MG/ML; MG/ML
SOLUTION OPHTHALMIC DAILY PRN
Status: DISCONTINUED | OUTPATIENT
Start: 2022-10-31 | End: 2022-10-31 | Stop reason: HOSPADM

## 2022-10-31 RX ORDER — PROPARACAINE HYDROCHLORIDE 5 MG/ML
1 SOLUTION/ DROPS OPHTHALMIC ONCE
Status: COMPLETED | OUTPATIENT
Start: 2022-10-31 | End: 2022-10-31

## 2022-10-31 RX ORDER — CYCLOPENTOLAT/TROPIC/PHENYLEPH 1%-1%-2.5%
1 DROPS (EA) OPHTHALMIC (EYE)
Status: COMPLETED | OUTPATIENT
Start: 2022-10-31 | End: 2022-10-31

## 2022-10-31 RX ORDER — FENTANYL CITRATE 50 UG/ML
INJECTION, SOLUTION INTRAMUSCULAR; INTRAVENOUS PRN
Status: DISCONTINUED | OUTPATIENT
Start: 2022-10-31 | End: 2022-10-31

## 2022-10-31 RX ORDER — MOXIFLOXACIN 5 MG/ML
1 SOLUTION/ DROPS OPHTHALMIC 3 TIMES DAILY
Qty: 3 ML | Refills: 1 | Status: SHIPPED | OUTPATIENT
Start: 2022-10-31 | End: 2023-08-21

## 2022-10-31 RX ORDER — MEPERIDINE HYDROCHLORIDE 25 MG/ML
12.5 INJECTION INTRAMUSCULAR; INTRAVENOUS; SUBCUTANEOUS
Status: DISCONTINUED | OUTPATIENT
Start: 2022-10-31 | End: 2022-11-01 | Stop reason: HOSPADM

## 2022-10-31 RX ORDER — ONDANSETRON 4 MG/1
4 TABLET, ORALLY DISINTEGRATING ORAL EVERY 30 MIN PRN
Status: DISCONTINUED | OUTPATIENT
Start: 2022-10-31 | End: 2022-11-01 | Stop reason: HOSPADM

## 2022-10-31 RX ORDER — ONDANSETRON 2 MG/ML
4 INJECTION INTRAMUSCULAR; INTRAVENOUS EVERY 30 MIN PRN
Status: DISCONTINUED | OUTPATIENT
Start: 2022-10-31 | End: 2022-11-01 | Stop reason: HOSPADM

## 2022-10-31 RX ORDER — PREDNISOLONE ACETATE 10 MG/ML
1 SUSPENSION/ DROPS OPHTHALMIC 4 TIMES DAILY
Qty: 10 ML | Refills: 1 | Status: SHIPPED | OUTPATIENT
Start: 2022-10-31 | End: 2023-08-21

## 2022-10-31 RX ORDER — DEXAMETHASONE SODIUM PHOSPHATE 4 MG/ML
INJECTION, SOLUTION INTRA-ARTICULAR; INTRALESIONAL; INTRAMUSCULAR; INTRAVENOUS; SOFT TISSUE DAILY PRN
Status: DISCONTINUED | OUTPATIENT
Start: 2022-10-31 | End: 2022-10-31 | Stop reason: HOSPADM

## 2022-10-31 RX ADMIN — PROPARACAINE HYDROCHLORIDE 1 DROP: 5 SOLUTION/ DROPS OPHTHALMIC at 09:09

## 2022-10-31 RX ADMIN — Medication 1 DROP: at 09:09

## 2022-10-31 RX ADMIN — Medication 1 DROP: at 09:19

## 2022-10-31 RX ADMIN — FENTANYL CITRATE 25 MCG: 50 INJECTION, SOLUTION INTRAMUSCULAR; INTRAVENOUS at 09:30

## 2022-10-31 RX ADMIN — SODIUM CHLORIDE, SODIUM LACTATE, POTASSIUM CHLORIDE, CALCIUM CHLORIDE: 600; 310; 30; 20 INJECTION, SOLUTION INTRAVENOUS at 08:45

## 2022-10-31 RX ADMIN — Medication 1 DROP: at 09:15

## 2022-10-31 RX ADMIN — FENTANYL CITRATE 25 MCG: 50 INJECTION, SOLUTION INTRAMUSCULAR; INTRAVENOUS at 09:25

## 2022-10-31 ASSESSMENT — EXTERNAL EXAM - LEFT EYE: OS_EXAM: NORMAL

## 2022-10-31 ASSESSMENT — TONOMETRY
OS_IOP_MMHG: 15
IOP_METHOD: TONOPEN

## 2022-10-31 ASSESSMENT — SLIT LAMP EXAM - LIDS: COMMENTS: PROTECTIVE PTOSIS

## 2022-10-31 ASSESSMENT — VISUAL ACUITY
OS_SC: 20/50-2
METHOD: SNELLEN - LINEAR

## 2022-10-31 NOTE — OP NOTE
PREOPERATIVE DIAGNOSIS:   1. Combined forms of age-related cataract, mod, of left eye    2. Primary open angle glaucoma (POAG) of both eyes, mild stage    POSTOPERATIVE DIAGNOSIS: Same   PROCEDURES:   1. Complex cataract extraction with intraocular lens implant left eye   2. iStent insertion, Left eye   SURGEON: Yakov Odell M.D.  Assistant: Zuri Cortez MD   INDICATIONS: The patient Sharmin Shipley presented to the eye clinic with decreased vision secondary to cataract in the LEFT eye. The risks, benefits and alternatives to cataract extraction were discussed. The patient elected to proceed. All questions were answered to the patient's satisfaction.   DESCRIPTION OF PROCEDURE:   Prior to the procedure, appropriate cardiac and respiratory monitors were applied to the patient.  In the pre-operative holding area, a drop of topical tetracaine followed by lidocaine gel followed by povidone iodine.  The patient was brought to the operating room where a surgical pause was carried out to identify with all members of the surgical team the correct surgical site.  With adequate anesthesia, the LEFT eye was prepped and draped in the usual sterile fashion. A lid speculum was placed, and the operating microscope was rotated into position. A paracentesis was created.  Through this limbal paracentesis, the anterior chamber was filled with preservative-free lidocaine followed by viscoelastic.  A temporal wound was created at the limbus using a 2.6 mm blade.  Due to poor mydriasis, a Malyugin (6.25 mm) capsular ring was inserted in to the eye to provide for pupillary expansion.  A capsulorrhexis was initiated using a bent 25-gauge needle and was completed in continuous and circular fashion using the capsulorrhexis forceps. The lens nucleus was hydrodissected using balanced salt solution.  The lens nucleus was rotated and removed using phacoemulsification in a stop and chop technique.  Residual cortical material was  removed using irrigation-aspiration.  The capsular bag was reinflated to its maximal extent with cohesive viscoelastic.  A 23.0 diopter [id number] was inserted into the capsular bag.  The lens power selected was reviewed using the intraocular lens power measurements that were obtained preoperatively to confirm that the correct lens was selected for the desired post-operative refractive state. The ring was removed.  The patient and the operating microscope were repositioned to allow for direct gonioscopy.  A Chester-Ambrose lens was placed to visualize the nasal angle anatomy.  The intended insertion site at the trabecular meshwork was identified.  Using a left-directed iStent, the Schlemm's canal was cannulated.  Gentle manipulation was done to confirm seating of the device and proper positioning within the angle.  The iStent drains by direct bypass through the trabecular meshwork into the Schlemm Canal.  The remaining viscoelastic was removed from the anterior chamber in its entirety, the wound were hydrated and found to be self-sealing.  Intracameral moxifloxacin was administered. Tactile pressure was confirmed to be in a normal range.  Subconjunctival Dexamethasone (2 mg) was injected.. The lid speculum was removed and a patch and shield were applied.  The patient tolerated the procedure well, and there were no complications.     PLAN: The patient will be discharged to home and will follow up tomorrow morning in the eye clinic.  EBL:  < 1 mL   Complications:  None  Implant Name Type Inv. Item Serial No.  Lot No. LRB No. Used Action   EYE IMP ISTENT TRABECULAR MICRO-BYPASS LT [id number] - [id number] Lens/Eye Implant EYE IMP ISTENT TRABECULAR MICRO-BYPASS LT [id number]  Left 1 Implanted   EYE IMP IOL MAT ACRYSOF UV SA60WF 23.0D - [id number] Lens/Eye Implant EYE IMP IOL MAT ACRYSOF UV SA60WF 23.0D [id number] [employer]S  Left 1 Implanted        Attending Physician Procedure Attestation: I  was present for the entire procedure       Yakov Odell MD  , Comprehensive Ophthalmology  Department of Ophthalmology and Visual Neurosciences  [facility]

## 2022-10-31 NOTE — DISCHARGE INSTRUCTIONS
[facility]  Home Care Following Anesthesia  For 24 hours after surgery:  Get plenty of rest.  A responsible adult must stay with you for at least 24 hours after you leave the surgery center.  Do not drive or use heavy equipment.  If you have weakness or tingling, don't drive or use heavy equipment until this feeling goes away.   Do not drink alcohol.   Avoid strenuous or risky activities.  Ask for help when climbing stairs.  You may feel lightheaded.  IF so, sit for a few minutes before standing.  Have someone help you get up.   If you have nausea (feel sick to your stomach): Drink only clear liquids such as apple juice, ginger ale, broth or 7-Up.  Rest may also help.  Be sure to drink enough fluids.  Move to a regular diet as you feel able.   You may have a slight fever.  Call the doctor if your fever is over 100 F (37.7 C) (taken under the tongue) or lasts longer than 24 hours.  You may have a dry mouth, a sore throat, muscle aches or trouble sleeping. These should go away after 24 hours.  Do not make important or legal decisions.   It is recommended to avoid smoking.               Tips for taking pain medications  To get the best pain relief possible, remember these points:  Take pain medications as directed, before pain becomes severe.  Pain medication can upset your stomach: taking it with food may help.  Constipation is a common side effect of pain medication. Drink plenty of  fluids.  Eat foods high in fiber. Take a stool softener if recommended by your doctor or pharmacist.  Do not drink alcohol, drive or operate machinery while taking pain medications.  Ask about other ways to control pain, such as with heat, ice or relaxation.    Tylenol/Acetaminophen Consumption  To help encourage the safe use of acetaminophen, the makers of TYLENOL  have lowered the maximum daily dose for single-ingredient Extra Strength TYLENOL  (acetaminophen) products sold in the [place] from 8 pills  per day (4,000 mg) to 6 pills per day (3,000 mg). The dosing interval has also changed from 2 pills every 4-6 hours to 2 pills every 6 hours.  If you feel your pain relief is insufficient, you may take Tylenol/Acetaminophen in addition to your narcotic pain medication.   Be careful not to exceed 3,000 mg of Tylenol/Acetaminophen in a 24 hour period from all sources.  If you are taking extra strength Tylenol/acetaminophen (500 mg), the maximum dose is 6 tablets in 24 hours.  If you are taking regular strength acetaminophen (325 mg), the maximum dose is 9 tablets in 24 hours.    Call a doctor for any of the following:  Signs of infection (fever, growing tenderness at the surgery site, a large amount of drainage or bleeding, severe pain, foul-smelling drainage, redness, swelling).  It has been over 8 to 10 hours since surgery and you are still not able to urinate (pass water).  Headache for over 24 hours.  Numbness, tingling or weakness the day after surgery (if you had spinal anesthesia).  Signs of Covid-19 infection (temperature over 100 degrees, shortness of breath, cough, loss of taste/smell, generalized body aches, persistent headache, chills, sore throat, nausea/vomiting/diarrhea)  Your doctor is:       Dr. Yakov Odell, Ophthalmology: [phone]               Or dial [phone] and ask for the resident on call for:  Ophthalmology  For emergency care, call the:  [facility]:  [phone] (TTY for hearing impaired: [phone])

## 2022-10-31 NOTE — ANESTHESIA CARE TRANSFER NOTE
Patient: Sharmin Shipley    Procedure: Procedure(s):  LEFT EYE PHACOEMULSIFICATION, Complex CATARACT, WITH  INTRAOCULAR LENS IMPLANT INSERTION  INSERTION, iSTENT LEFT       Diagnosis: Combined forms of age-related cataract of left eye [H25.812]  Combined forms of age-related cataract of right eye [H25.811]  Primary open angle glaucoma (POAG) of both eyes, mild stage [H40.1131]  Diagnosis Additional Information: No value filed.    Anesthesia Type:   No value filed.     Note:    Oropharynx: spontaneously breathing  Level of Consciousness: awake  Oxygen Supplementation: room air    Independent Airway: airway patency satisfactory and stable  Dentition: dentition unchanged  Vital Signs Stable: post-procedure vital signs reviewed and stable  Report to RN Given: handoff report given  Patient transferred to: Phase II    Handoff Report: Identifed the Patient, Identified the Reponsible Provider, Reviewed the pertinent medical history, Discussed the surgical course, Reviewed Intra-OP anesthesia mangement and issues during anesthesia, Set expectations for post-procedure period and Allowed opportunity for questions and acknowledgement of understanding      Vitals:  Vitals Value Taken Time   BP     Temp     Pulse     Resp     SpO2         Electronically Signed By: JIM Cruz CRNA  October 31, 2022  9:50 AM

## 2022-10-31 NOTE — ANESTHESIA POSTPROCEDURE EVALUATION
Patient: Sharmin Shipley    Procedure: Procedure(s):  LEFT EYE PHACOEMULSIFICATION, Complex CATARACT, WITH  INTRAOCULAR LENS IMPLANT INSERTION  INSERTION, iSTENT LEFT       Anesthesia Type:  MAC    Note:  Disposition: Outpatient   Postop Pain Control: Uneventful            Sign Out: Well controlled pain   PONV: No   Neuro/Psych: Uneventful            Sign Out: Acceptable/Baseline neuro status   Airway/Respiratory: Uneventful            Sign Out: Acceptable/Baseline resp. status   CV/Hemodynamics: Uneventful            Sign Out: Acceptable CV status; No obvious hypovolemia; No obvious fluid overload   Other NRE: NONE   DID A NON-ROUTINE EVENT OCCUR? No           Last vitals:  Vitals Value Taken Time   /74 10/31/22 1015   Temp 36.3  C (97.4  F) 10/31/22 0953   Pulse     Resp 18 10/31/22 1015   SpO2 98 % 10/31/22 1015       Electronically Signed By: Otto Bai MD  October 31, 2022  11:27 AM

## 2022-10-31 NOTE — ANESTHESIA PREPROCEDURE EVALUATION
Anesthesia Pre-Procedure Evaluation    Patient: Sharmin Shipley   MRN: [id number] : 1945        Procedure : Procedure(s):  LEFT EYE PHACOEMULSIFICATION, CATARACT, WITH  INTRAOCULAR LENS IMPLANT INSERTION  INSERTION, iSTENT LEFT          Past Medical History:   Diagnosis Date     Cataract      GERD (gastroesophageal reflux disease)      Glaucoma (increased eye pressure)      High cholesterol      HTN (hypertension)      Peripheral artery disease (H) 2016      Past Surgical History:   Procedure Laterality Date     HYSTERECTOMY, VAGINAL       RENAL ARTERY ANGIOGRAM  01/15/2018      Allergies   Allergen Reactions     Ciprofloxacin Anaphylaxis     Strawberry Hives     Glycerin + [id number] + Peg Itching     Amlodipine Swelling and Other (See Comments)     Leg pain and mid and upper back pain.  Leg pain and mid and upper back pain.     Atorvastatin Muscle Pain (Myalgia)     Cephalexin Hives     Ciprofloxacin      Other reaction(s): Chest Pain, Tachycardia  2012 in  ED dept patient received IV dose and chest pain, wheezing and tachycardia started after 15 minutes of infusion.      Cleocin      Clindamycin Hives     Diovan Hct      Other reaction(s): Chest Pain  Chest tightness from diovan  Muscle cramps from hctz     Erythromycin      Fluress      Fluticasone Other (See Comments)     Throat burning     Gentamycin [Gentamicin Sulfate]      Keflex [Cephalexin-Fd&C Yellow #6]      Lisinopril Other (See Comments)     Abdominal pain     Maitake Hives     Penicillins Hives     Pravastatin Muscle Pain (Myalgia)     Arms and legs.     Rosuvastatin Muscle Pain (Myalgia)     Sulfa Drugs      Tetracycline      Ubidecarenone      Other reaction(s): Diaphoresis  Excessive sweating.     Amlactin Foot Cream Other (See Comments)     Red and burning     Azithromycin Rash     Brimonidine Other (See Comments)     Sinus congestion/cough     Latanoprost Other (See Comments)     Sinus congestion, cough     Liquid Adhesive Rash      Timolol      Sinus congestion, coughing       Social History     Tobacco Use     Smoking status: Every Day     Packs/day: 0.25     Types: Cigarettes     Smokeless tobacco: Never     Tobacco comments:     4-5 a day  8/17/18   Substance Use Topics     Alcohol use: Not Currently      Wt Readings from Last 1 Encounters:   10/31/22 47 kg (103 lb 9.9 oz)        Anesthesia Evaluation            ROS/MED HX  ENT/Pulmonary:       Neurologic:       Cardiovascular:     (+) Dyslipidemia hypertension-----    METS/Exercise Tolerance:     Hematologic:       Musculoskeletal:       GI/Hepatic:     (+) GERD,     Renal/Genitourinary:       Endo:       Psychiatric/Substance Use:       Infectious Disease:       Malignancy:       Other:            Physical Exam    Airway  airway exam normal           Respiratory Devices and Support         Dental       (+) upper dentures and lower dentures      Cardiovascular   cardiovascular exam normal          Pulmonary   pulmonary exam normal                OUTSIDE LABS:  CBC: No results found for: WBC, HGB, HCT, PLT  BMP: No results found for: NA, POTASSIUM, CHLORIDE, CO2, BUN, CR, GLC  COAGS: No results found for: PTT, INR, FIBR  POC: No results found for: BGM, HCG, HCGS  HEPATIC: No results found for: ALBUMIN, PROTTOTAL, ALT, AST, GGT, ALKPHOS, BILITOTAL, BILIDIRECT, HI  OTHER: No results found for: PH, LACT, A1C, RL, PHOS, MAG, LIPASE, AMYLASE, TSH, T4, T3, CRP, SED    Anesthesia Plan    ASA Status:  2   NPO Status:  NPO Appropriate    Anesthesia Type: MAC.     - Reason for MAC: straight local not clinically adequate   Induction: Intravenous.   Maintenance: TIVA.        Consents    Anesthesia Plan(s) and associated risks, benefits, and realistic alternatives discussed. Questions answered and patient/representative(s) expressed understanding.    - Discussed:     - Discussed with:  Patient      - Extended Intubation/Ventilatory Support Discussed: No.      - Patient is DNR/DNI Status: No     Use of blood products discussed: No .     Postoperative Care       PONV prophylaxis: Ondansetron (or other 5HT-3)     Comments:                Otto Bai MD

## 2022-11-11 ENCOUNTER — ANESTHESIA EVENT (OUTPATIENT)
Dept: SURGERY | Facility: AMBULATORY SURGERY CENTER | Age: 77
End: 2022-11-11
Payer: COMMERCIAL

## 2022-11-11 ENCOUNTER — LAB (OUTPATIENT)
Dept: LAB | Facility: CLINIC | Age: 77
End: 2022-11-11
Payer: COMMERCIAL

## 2022-11-11 DIAGNOSIS — Z20.822 ENCOUNTER FOR LABORATORY TESTING FOR COVID-19 VIRUS: ICD-10-CM

## 2022-11-11 PROCEDURE — U0003 INFECTIOUS AGENT DETECTION BY NUCLEIC ACID (DNA OR RNA); SEVERE ACUTE RESPIRATORY SYNDROME CORONAVIRUS 2 (SARS-COV-2) (CORONAVIRUS DISEASE [COVID-19]), AMPLIFIED PROBE TECHNIQUE, MAKING USE OF HIGH THROUGHPUT TECHNOLOGIES AS DESCRIBED BY CMS-2020-01-R: HCPCS

## 2022-11-11 PROCEDURE — U0005 INFEC AGEN DETEC AMPLI PROBE: HCPCS

## 2022-11-12 LAB — SARS-COV-2 RNA RESP QL NAA+PROBE: NEGATIVE

## 2022-11-13 NOTE — PROGRESS NOTES
POD#0, status post cataract surgery and iStent, RIGHT eye     Impression/Plan:  Pseudophakia, RIGHT EYE: POD0, good post-operative appearance. IOP reasonable.     Eye protection at all times and eye shield at night for 1 week.     Limited activities with no exercise or heavy lifting for 1 week.     Instructed patient to contact us for decreasing vision, eye pain, new floaters or flashes of light or other concerning symptoms.     Written instructions given    Drops:  Moxifloxacin TID for 7 days in operative eye  Prednisolone 4/3/2/1 taper every 7 days in operative eye    Follow up scheduled on 12/1/22    All questions were answered    Zuri Cortez MD  Ophthalmology Resident, PGY-4  [facility]     Attending Physician Attestation:  Complete documentation of historical and exam elements from today's encounter can be found in the full encounter summary report (not reduplicated in this progress note).  I personally obtained the chief complaint(s) and history of present illness.  I confirmed and edited as necessary the review of systems, past medical/surgical history, family history, social history, and examination findings as documented by others; and I examined the patient myself.  I personally reviewed the relevant tests, images, and reports as documented above.  I formulated and edited as necessary the assessment and plan and discussed the findings and management plan with the patient and family. . - Yakov Odell MD

## 2022-11-14 ENCOUNTER — OFFICE VISIT (OUTPATIENT)
Dept: OPHTHALMOLOGY | Facility: CLINIC | Age: 77
End: 2022-11-14

## 2022-11-14 ENCOUNTER — HOSPITAL ENCOUNTER (OUTPATIENT)
Facility: AMBULATORY SURGERY CENTER | Age: 77
Discharge: HOME | End: 2022-11-14
Attending: OPHTHALMOLOGY
Payer: COMMERCIAL

## 2022-11-14 ENCOUNTER — ANESTHESIA (OUTPATIENT)
Dept: SURGERY | Facility: AMBULATORY SURGERY CENTER | Age: 77
End: 2022-11-14
Payer: COMMERCIAL

## 2022-11-14 VITALS
OXYGEN SATURATION: 98 % | DIASTOLIC BLOOD PRESSURE: 63 MMHG | TEMPERATURE: 97.6 F | WEIGHT: 102 LBS | SYSTOLIC BLOOD PRESSURE: 136 MMHG | HEIGHT: 62 IN | HEART RATE: 80 BPM | BODY MASS INDEX: 18.77 KG/M2 | RESPIRATION RATE: 16 BRPM

## 2022-11-14 DIAGNOSIS — Z98.890 POSTOPERATIVE EYE STATE: Primary | ICD-10-CM

## 2022-11-14 DIAGNOSIS — H40.1131 PRIMARY OPEN ANGLE GLAUCOMA (POAG) OF BOTH EYES, MILD STAGE: Primary | ICD-10-CM

## 2022-11-14 DIAGNOSIS — H25.811 COMBINED FORMS OF AGE-RELATED CATARACT OF RIGHT EYE: ICD-10-CM

## 2022-11-14 PROCEDURE — C1783 OCULAR IMP, AQUEOUS DRAIN DE: HCPCS

## 2022-11-14 PROCEDURE — 66989 XCPSL CTRC RMVL CPLX INSJ 1+: CPT | Mod: 79 | Performed by: OPHTHALMOLOGY

## 2022-11-14 PROCEDURE — 66989 XCPSL CTRC RMVL CPLX INSJ 1+: CPT | Mod: RT

## 2022-11-14 PROCEDURE — 99024 POSTOP FOLLOW-UP VISIT: CPT | Mod: GC | Performed by: OPHTHALMOLOGY

## 2022-11-14 DEVICE — EYE IMP IOL ALCON ACRYSOF UV SA60WF 22.0 D: Type: IMPLANTABLE DEVICE | Site: EYE | Status: FUNCTIONAL

## 2022-11-14 DEVICE — EYE IMP ISTENT TRABECULAR MICRO-BYPASS LT GTS100L: Type: IMPLANTABLE DEVICE | Site: EYE | Status: FUNCTIONAL

## 2022-11-14 RX ORDER — SODIUM CHLORIDE, SODIUM LACTATE, POTASSIUM CHLORIDE, CALCIUM CHLORIDE 600; 310; 30; 20 MG/100ML; MG/100ML; MG/100ML; MG/100ML
INJECTION, SOLUTION INTRAVENOUS CONTINUOUS
Status: DISCONTINUED | OUTPATIENT
Start: 2022-11-14 | End: 2022-11-15 | Stop reason: HOSPADM

## 2022-11-14 RX ORDER — MEPERIDINE HYDROCHLORIDE 25 MG/ML
12.5 INJECTION INTRAMUSCULAR; INTRAVENOUS; SUBCUTANEOUS
Status: DISCONTINUED | OUTPATIENT
Start: 2022-11-14 | End: 2022-11-15 | Stop reason: HOSPADM

## 2022-11-14 RX ORDER — DEXAMETHASONE SODIUM PHOSPHATE 4 MG/ML
INJECTION, SOLUTION INTRA-ARTICULAR; INTRALESIONAL; INTRAMUSCULAR; INTRAVENOUS; SOFT TISSUE PRN
Status: DISCONTINUED | OUTPATIENT
Start: 2022-11-14 | End: 2022-11-14 | Stop reason: HOSPADM

## 2022-11-14 RX ORDER — TIMOLOL MALEATE 5 MG/ML
SOLUTION/ DROPS OPHTHALMIC PRN
Status: DISCONTINUED | OUTPATIENT
Start: 2022-11-14 | End: 2022-11-14 | Stop reason: HOSPADM

## 2022-11-14 RX ORDER — FENTANYL CITRATE 50 UG/ML
INJECTION, SOLUTION INTRAMUSCULAR; INTRAVENOUS PRN
Status: DISCONTINUED | OUTPATIENT
Start: 2022-11-14 | End: 2022-11-14

## 2022-11-14 RX ORDER — PREDNISOLONE ACETATE 10 MG/ML
1 SUSPENSION/ DROPS OPHTHALMIC 4 TIMES DAILY
Qty: 10 ML | Refills: 1 | Status: SHIPPED | OUTPATIENT
Start: 2022-11-14 | End: 2023-08-21

## 2022-11-14 RX ORDER — PROPARACAINE HYDROCHLORIDE 5 MG/ML
1 SOLUTION/ DROPS OPHTHALMIC ONCE
Status: COMPLETED | OUTPATIENT
Start: 2022-11-14 | End: 2022-11-14

## 2022-11-14 RX ORDER — ONDANSETRON 4 MG/1
4 TABLET, ORALLY DISINTEGRATING ORAL EVERY 30 MIN PRN
Status: DISCONTINUED | OUTPATIENT
Start: 2022-11-14 | End: 2022-11-15 | Stop reason: HOSPADM

## 2022-11-14 RX ORDER — CYCLOPENTOLAT/TROPIC/PHENYLEPH 1%-1%-2.5%
1 DROPS (EA) OPHTHALMIC (EYE)
Status: COMPLETED | OUTPATIENT
Start: 2022-11-14 | End: 2022-11-14

## 2022-11-14 RX ORDER — BALANCED SALT SOLUTION 6.4; .75; .48; .3; 3.9; 1.7 MG/ML; MG/ML; MG/ML; MG/ML; MG/ML; MG/ML
SOLUTION OPHTHALMIC PRN
Status: DISCONTINUED | OUTPATIENT
Start: 2022-11-14 | End: 2022-11-14 | Stop reason: HOSPADM

## 2022-11-14 RX ORDER — MOXIFLOXACIN IN NACL,ISO-OS/PF 0.3MG/0.3
SYRINGE (ML) INTRAOCULAR PRN
Status: DISCONTINUED | OUTPATIENT
Start: 2022-11-14 | End: 2022-11-14 | Stop reason: HOSPADM

## 2022-11-14 RX ORDER — LIDOCAINE HYDROCHLORIDE 10 MG/ML
INJECTION, SOLUTION EPIDURAL; INFILTRATION; INTRACAUDAL; PERINEURAL PRN
Status: DISCONTINUED | OUTPATIENT
Start: 2022-11-14 | End: 2022-11-14 | Stop reason: HOSPADM

## 2022-11-14 RX ORDER — MOXIFLOXACIN 5 MG/ML
1 SOLUTION/ DROPS OPHTHALMIC 3 TIMES DAILY
Qty: 3 ML | Refills: 1 | Status: SHIPPED | OUTPATIENT
Start: 2022-11-14 | End: 2023-08-21

## 2022-11-14 RX ORDER — LIDOCAINE 40 MG/G
CREAM TOPICAL
Status: DISCONTINUED | OUTPATIENT
Start: 2022-11-14 | End: 2022-11-14 | Stop reason: HOSPADM

## 2022-11-14 RX ORDER — SODIUM CHLORIDE, SODIUM LACTATE, POTASSIUM CHLORIDE, CALCIUM CHLORIDE 600; 310; 30; 20 MG/100ML; MG/100ML; MG/100ML; MG/100ML
INJECTION, SOLUTION INTRAVENOUS CONTINUOUS
Status: DISCONTINUED | OUTPATIENT
Start: 2022-11-14 | End: 2022-11-14 | Stop reason: HOSPADM

## 2022-11-14 RX ORDER — TETRACAINE HYDROCHLORIDE 5 MG/ML
SOLUTION OPHTHALMIC PRN
Status: DISCONTINUED | OUTPATIENT
Start: 2022-11-14 | End: 2022-11-14 | Stop reason: HOSPADM

## 2022-11-14 RX ORDER — ACETAMINOPHEN 325 MG/1
975 TABLET ORAL ONCE
Status: COMPLETED | OUTPATIENT
Start: 2022-11-14 | End: 2022-11-14

## 2022-11-14 RX ORDER — ONDANSETRON 2 MG/ML
4 INJECTION INTRAMUSCULAR; INTRAVENOUS EVERY 30 MIN PRN
Status: DISCONTINUED | OUTPATIENT
Start: 2022-11-14 | End: 2022-11-15 | Stop reason: HOSPADM

## 2022-11-14 RX ADMIN — ACETAMINOPHEN 975 MG: 325 TABLET ORAL at 07:14

## 2022-11-14 RX ADMIN — Medication 1 DROP: at 07:31

## 2022-11-14 RX ADMIN — PROPARACAINE HYDROCHLORIDE 1 DROP: 5 SOLUTION/ DROPS OPHTHALMIC at 07:16

## 2022-11-14 RX ADMIN — FENTANYL CITRATE 50 MCG: 50 INJECTION, SOLUTION INTRAMUSCULAR; INTRAVENOUS at 08:57

## 2022-11-14 RX ADMIN — Medication 1 DROP: at 07:26

## 2022-11-14 RX ADMIN — Medication 1 DROP: at 07:16

## 2022-11-14 RX ADMIN — SODIUM CHLORIDE, SODIUM LACTATE, POTASSIUM CHLORIDE, CALCIUM CHLORIDE: 600; 310; 30; 20 INJECTION, SOLUTION INTRAVENOUS at 07:27

## 2022-11-14 ASSESSMENT — SLIT LAMP EXAM - LIDS: COMMENTS: PROTECTIVE PTOSIS

## 2022-11-14 ASSESSMENT — VISUAL ACUITY: METHOD: SNELLEN - LINEAR

## 2022-11-14 ASSESSMENT — EXTERNAL EXAM - RIGHT EYE: OD_EXAM: NORMAL

## 2022-11-14 ASSESSMENT — TONOMETRY
IOP_METHOD: TONOPEN
OD_IOP_MMHG: 11

## 2022-11-14 NOTE — OP NOTE
PREOPERATIVE DIAGNOSIS:   1. Primary open angle glaucoma (POAG) of both eyes, mild stage    2. Combined forms of age-related cataract, right eye      POSTOPERATIVE DIAGNOSIS: Same   PROCEDURES:   1. Complex cataract extraction with intraocular lens implant Right eye  2. iStent insertion, Right eye.  SURGEON: Yakov Odell M.D.  Assistant: Francisco Valle MD   INDICATIONS: The patient Sharmin Shipley presented to the eye clinic with decreased vision secondary to cataract in the Right eye. The risks, benefits and alternatives to cataract extraction were discussed. The patient elected to proceed. All questions were answered to the patient's satisfaction.   DESCRIPTION OF PROCEDURE:   Prior to the procedure, appropriate cardiac and respiratory monitors were applied to the patient.  In the pre-operative holding area, a drop of topical tetracaine followed by lidocaine gel followed by povidone iodine.  The patient was brought to the operating room where a surgical pause was carried out to identify with all members of the surgical team the correct surgical site.  With adequate anesthesia, the Right eye was prepped and draped in the usual sterile fashion. A lid speculum was placed, and the operating microscope was rotated into position. A paracentesis was created.  Through this limbal paracentesis, the anterior chamber was filled with preservative-free lidocaine followed by viscoelastic.  A temporal wound was created at the limbus using a 2.6 mm blade. Due to poor mydriasis, a Malyugin (6.25 mm) capsular ring was inserted in to the eye to provide for pupillary expansion. A capsulorrhexis was initiated using a bent 25-gauge needle and was completed in continuous and circular fashion using the capsulorrhexis forceps. The lens nucleus was hydrodissected using balanced salt solution.  The lens nucleus was rotated and removed using phacoemulsification in a stop and chop technique.  Residual cortical material was removed  using irrigation-aspiration.  The capsular bag was reinflated to its maximal extent with cohesive viscoelastic.  A 22.0 diopter ZCBOO inserted into the capsular bag.  The lens power selected was reviewed using the intraocular lens power measurements that were obtained preoperatively to confirm that the correct lens was selected for the desired post-operative refractive state. Removal of the viscoelastic from the posterior capsular space was removed.  The ring was removed from the anterior chamber.  The patient and the operating microscope were repositioned to allow for direct gonioscopy.  A Tyler-Ambrose lens was placed to visualize the nasal angle anatomy.  The intended insertion site at the trabecular meshwork was identified.  Using a left-directed iStent, the Schlemm's canal was cannulated.  Gentle manipulation was done to confirm seating of the device and proper positioning within the angle.  The iStent drains by direct bypass through the trabecular meshwork into the Schlemm Canal.  The remaining viscoelastic was removed from the anterior chamber in its entirety, the wound were hydrated and found to be self-sealing.  Intracameral moxifloxacin was administered. Tactile pressure was confirmed to be in a normal range. Subconjunctival Dexamethasone (2 mg) was injected..  The lid speculum was removed and a patch and shield were applied.  The patient tolerated the procedure well, and there were no complications.     PLAN: The patient will be discharged to home and will follow up tomorrow morning in the eye clinic.  EBL:  None  Complications:  None    Implant Name Type Inv. Item Serial No.  Lot No. LRB No. Used Action   EYE IMP ISTENT TRABECULAR MICRO-BYPASS LT [id number] - [id number] Lens/Eye Implant EYE IMP ISTENT TRABECULAR MICRO-BYPASS LT [id number] [id number] Right 1 Implanted   EYE IMP IOL MAT ACRYSOF UV [id number] 22.0 D - [id number] Lens/Eye Implant EYE IMP IOL MAT ACRYSOF UV [id number]  22.0 D [id number] [employer]S  Right 1 Implanted             Attending Physician Procedure Attestation: I was present for the entire procedure       Yakov Odell MD  , Comprehensive Ophthalmology  Department of Ophthalmology and Visual Neurosciences  [facility]

## 2022-11-14 NOTE — OR NURSING
"Notified Dr. Resendez, pt's pre-op blood pressure is elevated. OK to proceed per MDA.    BP (!) 218/94   Pulse 80   Temp 97.9  F (36.6  C) (Temporal)   Resp 18   Ht 1.562 m (5' 1.5\")   Wt 46.3 kg (102 lb)   SpO2 100%   BMI 18.96 kg/m      "

## 2022-11-14 NOTE — ANESTHESIA CARE TRANSFER NOTE
Patient: Sharmin Shipley    Procedure: Procedure(s):  RIGHT EYE PHACOEMULSIFICATION, CATARACT, COMPLEX WITH STANDARD INTRAOCULAR LENS IMPLANT INSERTION  INSERTION, iSTENT       Diagnosis: Combined forms of age-related cataract of left eye [H25.812]  Combined forms of age-related cataract of right eye [H25.811]  Primary open angle glaucoma (POAG) of both eyes, mild stage [H40.1131]  Diagnosis Additional Information: No value filed.    Anesthesia Type:   MAC     Note:    Oropharynx: oropharynx clear of all foreign objects and spontaneously breathing  Level of Consciousness: awake  Oxygen Supplementation: room air    Independent Airway: airway patency satisfactory and stable  Dentition: dentition unchanged  Vital Signs Stable: post-procedure vital signs reviewed and stable  Report to RN Given: handoff report given  Patient transferred to: Phase II    Handoff Report: Identifed the Patient, Identified the Reponsible Provider, Reviewed the pertinent medical history, Discussed the surgical course, Reviewed Intra-OP anesthesia mangement and issues during anesthesia, Set expectations for post-procedure period and Allowed opportunity for questions and acknowledgement of understanding      Vitals:  Vitals Value Taken Time   /64    Temp 98.1    Pulse 64    Resp 12    SpO2 97        Electronically Signed By: ADDI CLEARY  November 14, 2022  9:28 AM

## 2022-11-14 NOTE — ANESTHESIA POSTPROCEDURE EVALUATION
Patient: Sharmin Shipley    Procedure: Procedure(s):  RIGHT EYE PHACOEMULSIFICATION, CATARACT, COMPLEX WITH STANDARD INTRAOCULAR LENS IMPLANT INSERTION  INSERTION, iSTENT       Anesthesia Type:  MAC    Note:  Disposition: Outpatient   Postop Pain Control: Uneventful            Sign Out: Well controlled pain   PONV: No   Neuro/Psych: Uneventful            Sign Out: Acceptable/Baseline neuro status   Airway/Respiratory: Uneventful            Sign Out: Acceptable/Baseline resp. status   CV/Hemodynamics: Uneventful            Sign Out: Acceptable CV status; No obvious hypovolemia; No obvious fluid overload   Other NRE: NONE   DID A NON-ROUTINE EVENT OCCUR? No           Last vitals:  Vitals Value Taken Time   /63 11/14/22 0955   Temp 36.4  C (97.6  F) 11/14/22 0925   Pulse     Resp 16 11/14/22 0955   SpO2 98 % 11/14/22 0955       Electronically Signed By: Claudy Resendez MD, MD  November 14, 2022  2:17 PM   Pt's mother returned call, verified pt by name and date of birth. Mother given information on test results from Dr. Chris Lyn below. Mother verbalized understanding, denied any further concerns or questions at this time.

## 2022-11-14 NOTE — ANESTHESIA PREPROCEDURE EVALUATION
Anesthesia Pre-Procedure Evaluation    Patient: Sharmin Shipley   MRN: [id number] : 1945        Procedure : Procedure(s):  RIGHT EYE PHACOEMULSIFICATION, CATARACT, WITH STANDARD INTRAOCULAR LENS IMPLANT INSERTION  INSERTION, iSTENT          Past Medical History:   Diagnosis Date     Cataract      GERD (gastroesophageal reflux disease)      Glaucoma (increased eye pressure)      High cholesterol      HTN (hypertension)      Peripheral artery disease (H)       Past Surgical History:   Procedure Laterality Date     HYSTERECTOMY, VAGINAL       PHACOEMULSIFICATION WITH STANDARD INTRAOCULAR LENS IMPLANT Left 10/31/2022    Procedure: LEFT EYE PHACOEMULSIFICATION, Complex CATARACT, WITH  INTRAOCULAR LENS IMPLANT INSERTION;  Surgeon: Yakov Odell MD;  Location: [facility] [place] OR     RENAL ARTERY ANGIOGRAM  01/15/2018     TRABECULAR MICRO-BYPASS WITH ISTENT Left 10/31/2022    Procedure: INSERTION, iSTENT LEFT;  Surgeon: Yakov Odell MD;  Location: [facility] [place] [facility] OR      Allergies   Allergen Reactions     Ciprofloxacin Anaphylaxis     Strawberry Hives     Glycerin + [id number] + Peg Itching     Amlodipine Swelling and Other (See Comments)     Leg pain and mid and upper back pain.  Leg pain and mid and upper back pain.     Atorvastatin Muscle Pain (Myalgia)     Cephalexin Hives     Ciprofloxacin      Other reaction(s): Chest Pain, Tachycardia  2012 in  ED dept patient received IV dose and chest pain, wheezing and tachycardia started after 15 minutes of infusion.      Cleocin      Clindamycin Hives     Diovan Hct      Other reaction(s): Chest Pain  Chest tightness from diovan  Muscle cramps from hctz     Erythromycin      Fluress      Fluticasone Other (See Comments)     Throat burning     Gentamycin [Gentamicin Sulfate]      Keflex [Cephalexin-Fd&C Yellow #6]      Lisinopril Other (See Comments)     Abdominal pain     Maitake Hives     Penicillins Hives     Pravastatin Muscle Pain (Myalgia)     Arms and legs.      Rosuvastatin Muscle Pain (Myalgia)     Sulfa Drugs      Tetracycline      Ubidecarenone      Other reaction(s): Diaphoresis  Excessive sweating.     Amlactin Foot Cream Other (See Comments)     Red and burning     Azithromycin Rash     Brimonidine Other (See Comments)     Sinus congestion/cough     Latanoprost Other (See Comments)     Sinus congestion, cough     Liquid Adhesive Rash     Timolol      Sinus congestion, coughing       Social History     Tobacco Use     Smoking status: Every Day     Packs/day: 0.25     Types: Cigarettes     Smokeless tobacco: Never     Tobacco comments:     4-5 a day  8/17/18   Substance Use Topics     Alcohol use: Not Currently      Wt Readings from Last 1 Encounters:   11/14/22 46.3 kg (102 lb)        Anesthesia Evaluation            ROS/MED HX  ENT/Pulmonary:  - neg pulmonary ROS     Neurologic:  - neg neurologic ROS     Cardiovascular:     (+) hypertension-----    METS/Exercise Tolerance:     Hematologic:  - neg hematologic  ROS     Musculoskeletal:  - neg musculoskeletal ROS     GI/Hepatic:     (+) GERD, Asymptomatic on medication,     Renal/Genitourinary:  - neg Renal ROS     Endo:  - neg endo ROS     Psychiatric/Substance Use:  - neg psychiatric ROS     Infectious Disease:  - neg infectious disease ROS     Malignancy:  - neg malignancy ROS     Other:               OUTSIDE LABS:  CBC: No results found for: WBC, HGB, HCT, PLT  BMP: No results found for: NA, POTASSIUM, CHLORIDE, CO2, BUN, CR, GLC  COAGS: No results found for: PTT, INR, FIBR  POC: No results found for: BGM, HCG, HCGS  HEPATIC: No results found for: ALBUMIN, PROTTOTAL, ALT, AST, GGT, ALKPHOS, BILITOTAL, BILIDIRECT, HI  OTHER: No results found for: PH, LACT, A1C, RL, PHOS, MAG, LIPASE, AMYLASE, TSH, T4, T3, CRP, SED    Anesthesia Plan    ASA Status:  2      Anesthesia Type: MAC.     - Reason for MAC: immobility needed, straight local not clinically adequate              Consents    Anesthesia Plan(s) and  associated risks, benefits, and realistic alternatives discussed. Questions answered and patient/representative(s) expressed understanding.     - Discussed: Risks, Benefits and Alternatives for BOTH SEDATION and the PROCEDURE were discussed     - Discussed with:  Patient      - Extended Intubation/Ventilatory Support Discussed: No.      - Patient is DNR/DNI Status: No    Use of blood products discussed: No .     Postoperative Care    Pain management: IV analgesics, Oral pain medications.        Comments:                Claudy Resendez MD, MD

## 2022-11-14 NOTE — DISCHARGE INSTRUCTIONS
[facility]  Home Care Following Anesthesia  For 24 hours after surgery:  Get plenty of rest.  A responsible adult must stay with you for at least 24 hours after you leave the surgery center.  Do not drive or use heavy equipment.  If you have weakness or tingling, don't drive or use heavy equipment until this feeling goes away.   Do not drink alcohol.   Avoid strenuous or risky activities.  Ask for help when climbing stairs.  You may feel lightheaded.  IF so, sit for a few minutes before standing.  Have someone help you get up.   If you have nausea (feel sick to your stomach): Drink only clear liquids such as apple juice, ginger ale, broth or 7-Up.  Rest may also help.  Be sure to drink enough fluids.  Move to a regular diet as you feel able.   You may have a slight fever.  Call the doctor if your fever is over 100 F (37.7 C) (taken under the tongue) or lasts longer than 24 hours.  You may have a dry mouth, a sore throat, muscle aches or trouble sleeping. These should go away after 24 hours.  Do not make important or legal decisions.   It is recommended to avoid smoking.               Tips for taking pain medications  To get the best pain relief possible, remember these points:  Take pain medications as directed, before pain becomes severe.  Pain medication can upset your stomach: taking it with food may help.  Constipation is a common side effect of pain medication. Drink plenty of  fluids.  Eat foods high in fiber. Take a stool softener if recommended by your doctor or pharmacist.  Do not drink alcohol, drive or operate machinery while taking pain medications.  Ask about other ways to control pain, such as with heat, ice or relaxation.    Tylenol/Acetaminophen Consumption  To help encourage the safe use of acetaminophen, the makers of TYLENOL  have lowered the maximum daily dose for single-ingredient Extra Strength TYLENOL  (acetaminophen) products sold in the [place] from 8 pills  per day (4,000 mg) to 6 pills per day (3,000 mg). The dosing interval has also changed from 2 pills every 4-6 hours to 2 pills every 6 hours.  If you feel your pain relief is insufficient, you may take Tylenol/Acetaminophen in addition to your narcotic pain medication.   Be careful not to exceed 3,000 mg of Tylenol/Acetaminophen in a 24 hour period from all sources.  If you are taking extra strength Tylenol/acetaminophen (500 mg), the maximum dose is 6 tablets in 24 hours.  If you are taking regular strength acetaminophen (325 mg), the maximum dose is 9 tablets in 24 hours.  You last took Tylenol 975mg at 7:15am. Next available dose is at 1:15pm, then follow bottle instructions.    Call a doctor for any of the following:  Signs of infection (fever, growing tenderness at the surgery site, a large amount of drainage or bleeding, severe pain, foul-smelling drainage, redness, swelling).  It has been over 8 to 10 hours since surgery and you are still not able to urinate (pass water).  Headache for over 24 hours.  Numbness, tingling or weakness the day after surgery (if you had spinal anesthesia).  Signs of Covid-19 infection (temperature over 100 degrees, shortness of breath, cough, loss of taste/smell, generalized body aches, persistent headache, chills, sore throat, nausea/vomiting/diarrhea)  Your doctor is:       Dr. Yakov Odell, Ophthalmology: [phone]               Or dial [phone] and ask for the resident on call for:  Ophthalmology  For emergency care, call the:  [facility]:  [phone] (TTY for hearing impaired: [phone])

## 2022-11-16 ENCOUNTER — TELEPHONE (OUTPATIENT)
Dept: OPHTHALMOLOGY | Facility: CLINIC | Age: 77
End: 2022-11-16

## 2022-11-16 NOTE — TELEPHONE ENCOUNTER
Sharmin Shipley JASWINDER [phone]     Reviewed with Dr. Odell and called pt    Reviewed tolerated moxifloxacin well for opposite eye and ok to continue    Reviewed s/s of anaphylactic reaction and if would occur 911 call    Pt aware to reach out for any worsening symptoms/concerns    Shay Judge RN 5:21 PM 11/16/22            Spoke to pt at 0827    Pt with stinging on eye with application of eye drops-- lasting about 1-2 minutes    Pt not been using artificial tears    Recommended increase use of preservative free tears and may try 5 minutes before medicated eye drops    Pt with redness and looked 'goppy this morning.  By time of call eye cleared up and no redness.    Pt with h/o allergic reaction to ciprofloxin in past-- anaphylaltic reaction with IV administration.    Will review/confirm with Dr. Odell and ok to continue with moxifloxacin eye drop with h/o of oral/IV cipro reactions-- both fluoroquinolone's    Shay Judge RN 8:51 AM 11/16/22                      [facility] Call Center    Phone Message    May a detailed message be left on voicemail: yes     Reason for Call: Symptoms or Concerns     If patient has red-flag symptoms, warm transfer to triage line    Current symptom or concern: Pt had surgery with Dr. Odell on Monday. She says that when she puts the antibiotic drops in they sting a bit. She also woke up this morning and the corner of her eye was red and goopy.    Symptoms have been present for:  1 day(s)    Has patient previously been seen for this? No    By : Dr. Odell    Date: 11/14/22    Are there any new or worsening symptoms? Yes: Some redness and goopiness upon awakening this morning.      Action Taken: Message routed to:  [facility] ([facility] EYE    Travel Screening: Not Applicable

## 2022-12-01 ENCOUNTER — OFFICE VISIT (OUTPATIENT)
Dept: OPHTHALMOLOGY | Facility: CLINIC | Age: 77
End: 2022-12-01
Attending: OPHTHALMOLOGY
Payer: COMMERCIAL

## 2022-12-01 DIAGNOSIS — Z96.1 PSEUDOPHAKIA OF BOTH EYES: Primary | ICD-10-CM

## 2022-12-01 DIAGNOSIS — H02.834 DERMATOCHALASIS OF BOTH UPPER EYELIDS: ICD-10-CM

## 2022-12-01 DIAGNOSIS — H02.831 DERMATOCHALASIS OF BOTH UPPER EYELIDS: ICD-10-CM

## 2022-12-01 DIAGNOSIS — Z98.890 POSTOPERATIVE EYE STATE: ICD-10-CM

## 2022-12-01 PROCEDURE — G0463 HOSPITAL OUTPT CLINIC VISIT: HCPCS

## 2022-12-01 PROCEDURE — 99024 POSTOP FOLLOW-UP VISIT: CPT | Mod: GC | Performed by: OPHTHALMOLOGY

## 2022-12-01 ASSESSMENT — VISUAL ACUITY
OD_SC: 20/25
OS_SC+: -2
OD_SC+: -2
OS_PH_SC: 20/25
OS_SC: 20/40
METHOD: SNELLEN - LINEAR
OS_PH_SC+: -2

## 2022-12-01 ASSESSMENT — CONF VISUAL FIELD
OD_INFERIOR_NASAL_RESTRICTION: 0
OD_NORMAL: 1
OS_INFERIOR_TEMPORAL_RESTRICTION: 0
OS_NORMAL: 1
OS_INFERIOR_NASAL_RESTRICTION: 0
OD_SUPERIOR_NASAL_RESTRICTION: 0
OS_SUPERIOR_NASAL_RESTRICTION: 0
OD_SUPERIOR_TEMPORAL_RESTRICTION: 0
OD_INFERIOR_TEMPORAL_RESTRICTION: 0
METHOD: COUNTING FINGERS
OS_SUPERIOR_TEMPORAL_RESTRICTION: 0

## 2022-12-01 ASSESSMENT — REFRACTION_WEARINGRX
SPECS_TYPE: BIFOCAL
OS_SPHERE: +0.50
OD_CYLINDER: +1.50
OS_CYLINDER: +1.25
OD_SPHERE: +0.50
OS_AXIS: 074
OS_ADD: +3.50
OD_AXIS: 015
OD_ADD: +3.50

## 2022-12-01 ASSESSMENT — TONOMETRY
OD_IOP_MMHG: 10
IOP_METHOD: ICARE
OS_IOP_MMHG: 13

## 2022-12-01 ASSESSMENT — REFRACTION_MANIFEST
OS_SPHERE: -0.50
OD_SPHERE: -0.50
OS_ADD: +3.00
OD_ADD: +3.00
OD_CYLINDER: SPHERE
OS_CYLINDER: SPHERE

## 2022-12-01 ASSESSMENT — CUP TO DISC RATIO
OD_RATIO: 0.4
OS_RATIO: 0.5

## 2022-12-01 ASSESSMENT — SLIT LAMP EXAM - LIDS
COMMENTS: DERMATOCHALASIS WITH EXCESS SKIN RESTING ON LASHES
COMMENTS: DERMATOCHALASIS WITH EXCESS SKIN RESTING ON LASHES

## 2022-12-01 NOTE — NURSING NOTE
Chief Complaints and History of Present Illnesses   Patient presents with     Post Op (Ophthalmology) Right Eye     S/p PHACO IOL w/ iSTENT 11/14/22 w/ Dr. Odell             Chief Complaint(s) and History of Present Illness(es)     Post Op (Ophthalmology) Right Eye            Comments: S/p PHACO IOL w/ iSTENT 11/14/22 w/ Dr. Odell                  Comments    Pt notes no problems since surgery.  She had some pain in the corner of her RE at the beginning that is gone.   No flashes or floaters in either eye.  No redness or tearing in either eye.     MATTHEW WHITE COA December 1, 2022 12:53 PM

## 2022-12-01 NOTE — PROGRESS NOTES
Chief Complaint(s) and History of Present Illness(es)     Post Op (Ophthalmology) Right Eye            Comments: S/p PHACO IOL w/ iSTENT 11/14/22 w/ Dr. Odell        Comments    Pt notes no problems since surgery.  She had some pain in the corner of her RE at the beginning that is gone.   No flashes or floaters in either eye.  No redness or tearing in either eye.     Ocular meds:  Prenisolone BID RE     MATTHEW WHITE COA December 1, 2022 12:53 PM           Review of systems for the eyes was negative other than the pertinent positives/negatives listed in the HPI.      Assessment & Plan      Sharmin Shipley is a 77 year old female with the following diagnoses:   1. Pseudophakia of both eyes    2. Postoperative eye state    3. Dermatochalasis of both upper eyelids         Doing well  S/P cataract extraction + iStent both eyes   Intraocular pressure excellent without drops    Ok to resume normal activities  Taper Predforte as directed  Glasses prescription updated  Artificial tears as needed     Patient interested in surgery to correct dermatochalasis, which has been impeding on her superior field of vision and has been interfering with her ADLs. R/b/a of blepharoplasty were discussed with the patient, who would like to wait until next year to consider surgery.    Patient disposition:   Follow up with Dr. Estrella for ongoing glaucoma management   Cass as needed     Joanne Albrecht MD  Ophthalmology Resident, PGY-2    Attending Physician Attestation:  Complete documentation of historical and exam elements from today's encounter can be found in the full encounter summary report (not reduplicated in this progress note).  I personally obtained the chief complaint(s) and history of present illness.  I confirmed and edited as necessary the review of systems, past medical/surgical history, family history, social history, and examination findings as documented by others; and I examined the patient myself.  I personally  reviewed the relevant tests, images, and reports as documented above.  I formulated and edited as necessary the assessment and plan and discussed the findings and management plan with the patient and family. . - Yakov Odell MD

## 2023-01-08 ENCOUNTER — HEALTH MAINTENANCE LETTER (OUTPATIENT)
Age: 78
End: 2023-01-08

## 2023-01-19 ENCOUNTER — LAB REQUISITION (OUTPATIENT)
Dept: LAB | Facility: CLINIC | Age: 78
End: 2023-01-19
Payer: COMMERCIAL

## 2023-01-19 DIAGNOSIS — R35.0 FREQUENCY OF MICTURITION: ICD-10-CM

## 2023-01-19 PROCEDURE — 87086 URINE CULTURE/COLONY COUNT: CPT | Mod: ORL | Performed by: OBSTETRICS & GYNECOLOGY

## 2023-01-21 LAB — BACTERIA UR CULT: NORMAL

## 2023-04-23 ENCOUNTER — HEALTH MAINTENANCE LETTER (OUTPATIENT)
Age: 78
End: 2023-04-23

## 2023-08-21 ENCOUNTER — OFFICE VISIT (OUTPATIENT)
Dept: OPHTHALMOLOGY | Facility: CLINIC | Age: 78
End: 2023-08-21
Payer: COMMERCIAL

## 2023-08-21 DIAGNOSIS — H26.8 PSEUDOEXFOLIATION OF LENS CAPSULE: ICD-10-CM

## 2023-08-21 DIAGNOSIS — Z96.1 PSEUDOPHAKIA OF BOTH EYES: Primary | ICD-10-CM

## 2023-08-21 DIAGNOSIS — H40.1131 PRIMARY OPEN ANGLE GLAUCOMA (POAG) OF BOTH EYES, MILD STAGE: ICD-10-CM

## 2023-08-21 PROCEDURE — 92012 INTRM OPH EXAM EST PATIENT: CPT | Performed by: OPHTHALMOLOGY

## 2023-08-21 RX ORDER — ALIROCUMAB 75 MG/ML
75 INJECTION, SOLUTION SUBCUTANEOUS
COMMUNITY
Start: 2023-03-07 | End: 2024-08-23

## 2023-08-21 ASSESSMENT — VISUAL ACUITY
OD_CC: 20/20
METHOD: SNELLEN - LINEAR
CORRECTION_TYPE: GLASSES
OS_CC: 20/30
OD_CC+: -1

## 2023-08-21 ASSESSMENT — TONOMETRY
OD_IOP_MMHG: 10
OS_IOP_MMHG: 17
IOP_METHOD: ICARE

## 2023-08-21 NOTE — PROGRESS NOTES
Current Eye Medications:  None     Subjective:  Here for eye pressure check. S/p PHACO IOL w/ iSTENT 11/2022 both eyes w/ Dr. Odell. Last saw Dr. Mills 12/01/2022. Eyes feel dry sometimes and excess skin seems above eyelids to be getting in the way. Vision improved with surgery per patient. Patient wears rx glasses sometimes but can get around fine without them.      Objective:  See Ophthalmology Exam.       Assessment:  Stable intraocular pressure both eyes in patient s/p Kelman phacoemulsification with iStent both eyes.  Hx of pseudoexfoliation right eye.      Plan:  Continue observation with regard to glaucoma suspect status.   Possible clouding of posterior capsule both eyes discussed.   Return visit 6 months for a complete exam.  Dhruv Estrella M.D.  [phone]

## 2023-08-21 NOTE — LETTER
8/21/2023         RE: Sharmin Shipley  [address] [facility] [address]        Dear Colleague,    Thank you for referring your patient, Sharmin Shipley, to the [facility]. Please see a copy of my visit note below.     Current Eye Medications:  None     Subjective:  Here for eye pressure check. S/p PHACO IOL w/ iSTENT 11/2022 both eyes w/ Dr. Odell. Last saw Dr. Mills 12/01/2022. Eyes feel dry sometimes and excess skin seems above eyelids to be getting in the way. Vision improved with surgery per patient. Patient wears rx glasses sometimes but can get around fine without them.      Objective:  See Ophthalmology Exam.       Assessment:  Stable intraocular pressure both eyes in patient s/p Kelman phacoemulsification with iStent both eyes.  Hx of pseudoexfoliation right eye.      Plan:  Continue observation with regard to glaucoma suspect status.   Possible clouding of posterior capsule both eyes discussed.   Return visit 6 months for a complete exam.  Dhruv Estrella M.D.  [phone]         Again, thank you for allowing me to participate in the care of your patient.        Sincerely,        Dhruv Estrella MD

## 2023-08-21 NOTE — PATIENT INSTRUCTIONS
Continue observation with regard to glaucoma suspect status.   Possible clouding of posterior capsule both eyes discussed.   Return visit 6 months for a complete exam.  Dhruv Estrella M.D.  [phone]

## 2023-08-23 PROBLEM — H25.811 COMBINED FORMS OF AGE-RELATED CATARACT OF RIGHT EYE: Status: RESOLVED | Noted: 2018-08-18 | Resolved: 2023-08-23

## 2023-08-23 PROBLEM — H25.812 COMBINED FORMS OF AGE-RELATED CATARACT OF LEFT EYE: Status: RESOLVED | Noted: 2018-08-18 | Resolved: 2023-08-23

## 2023-08-23 PROBLEM — Z96.1 PSEUDOPHAKIA OF BOTH EYES: Status: ACTIVE | Noted: 2023-08-23

## 2023-08-23 ASSESSMENT — EXTERNAL EXAM - RIGHT EYE: OD_EXAM: NORMAL

## 2023-08-23 ASSESSMENT — EXTERNAL EXAM - LEFT EYE: OS_EXAM: NORMAL

## 2023-11-07 ENCOUNTER — LAB REQUISITION (OUTPATIENT)
Dept: LAB | Facility: CLINIC | Age: 78
End: 2023-11-07
Payer: COMMERCIAL

## 2023-11-07 DIAGNOSIS — R35.0 FREQUENCY OF MICTURITION: ICD-10-CM

## 2023-11-07 PROCEDURE — 87086 URINE CULTURE/COLONY COUNT: CPT | Mod: ORL | Performed by: OBSTETRICS & GYNECOLOGY

## 2023-11-09 LAB — BACTERIA UR CULT: NORMAL

## 2024-02-21 ENCOUNTER — OFFICE VISIT (OUTPATIENT)
Dept: OPHTHALMOLOGY | Facility: CLINIC | Age: 79
End: 2024-02-21
Payer: COMMERCIAL

## 2024-02-21 DIAGNOSIS — H40.1131 PRIMARY OPEN ANGLE GLAUCOMA (POAG) OF BOTH EYES, MILD STAGE: ICD-10-CM

## 2024-02-21 DIAGNOSIS — H26.8 PSEUDOEXFOLIATION OF LENS CAPSULE: ICD-10-CM

## 2024-02-21 DIAGNOSIS — H35.033 HYPERTENSIVE RETINOPATHY OF BOTH EYES: ICD-10-CM

## 2024-02-21 DIAGNOSIS — Z96.1 PSEUDOPHAKIA OF BOTH EYES: Primary | ICD-10-CM

## 2024-02-21 DIAGNOSIS — H02.831 DERMATOCHALASIS OF BOTH UPPER EYELIDS: ICD-10-CM

## 2024-02-21 DIAGNOSIS — H02.834 DERMATOCHALASIS OF BOTH UPPER EYELIDS: ICD-10-CM

## 2024-02-21 DIAGNOSIS — Z01.01 ENCOUNTER FOR EXAMINATION OF EYES AND VISION WITH ABNORMAL FINDINGS: ICD-10-CM

## 2024-02-21 DIAGNOSIS — H43.812 POSTERIOR VITREOUS DETACHMENT, LEFT EYE: ICD-10-CM

## 2024-02-21 DIAGNOSIS — H52.4 PRESBYOPIA: ICD-10-CM

## 2024-02-21 PROBLEM — K55.1 CHRONIC MESENTERIC ISCHEMIA (H): Status: ACTIVE | Noted: 2022-02-22

## 2024-02-21 PROBLEM — I16.0 HYPERTENSIVE URGENCY: Status: ACTIVE | Noted: 2023-03-27

## 2024-02-21 PROBLEM — E78.2 MIXED HYPERLIPIDEMIA: Status: ACTIVE | Noted: 2017-02-21

## 2024-02-21 PROBLEM — J32.9 RECURRENT SINUSITIS: Status: ACTIVE | Noted: 2017-01-02

## 2024-02-21 PROBLEM — D64.9 ANEMIA: Status: ACTIVE | Noted: 2024-02-21

## 2024-02-21 PROBLEM — R42 LIGHTHEADEDNESS: Status: ACTIVE | Noted: 2023-03-27

## 2024-02-21 PROBLEM — R53.1 GENERALIZED WEAKNESS: Status: ACTIVE | Noted: 2023-03-27

## 2024-02-21 PROBLEM — I25.2 HISTORY OF NON-ST ELEVATION MYOCARDIAL INFARCTION (NSTEMI): Status: ACTIVE | Noted: 2023-05-10

## 2024-02-21 PROBLEM — F17.200 CURRENT EVERY DAY SMOKER: Status: ACTIVE | Noted: 2019-09-12

## 2024-02-21 PROBLEM — I65.29 CAROTID ARTERY STENOSIS: Status: ACTIVE | Noted: 2024-02-21

## 2024-02-21 PROBLEM — R31.29 MICROSCOPIC HEMATURIA: Status: ACTIVE | Noted: 2024-02-21

## 2024-02-21 PROBLEM — I25.10 CAD, MULTIPLE VESSEL: Status: ACTIVE | Noted: 2023-05-19

## 2024-02-21 PROBLEM — I10 ESSENTIAL HYPERTENSION: Status: ACTIVE | Noted: 2018-09-21

## 2024-02-21 PROBLEM — I73.9 PERIPHERAL VASCULAR DISEASE (H): Status: ACTIVE | Noted: 2024-02-21

## 2024-02-21 PROBLEM — H91.93 BILATERAL HEARING LOSS: Status: ACTIVE | Noted: 2024-02-21

## 2024-02-21 PROBLEM — N18.9 CHRONIC KIDNEY DISEASE: Status: ACTIVE | Noted: 2024-02-21

## 2024-02-21 PROCEDURE — 92015 DETERMINE REFRACTIVE STATE: CPT | Performed by: OPHTHALMOLOGY

## 2024-02-21 PROCEDURE — 92014 COMPRE OPH EXAM EST PT 1/>: CPT | Performed by: OPHTHALMOLOGY

## 2024-02-21 RX ORDER — PANTOPRAZOLE SODIUM 40 MG/1
TABLET, DELAYED RELEASE ORAL
COMMUNITY
Start: 2023-05-22

## 2024-02-21 ASSESSMENT — REFRACTION_WEARINGRX
OD_SPHERE: -0.25
SPECS_TYPE: PAL
OS_SPHERE: -0.50
OS_ADD: +3.00
OD_CYLINDER: SPHERE
OS_CYLINDER: SPHERE
OD_ADD: +3.00

## 2024-02-21 ASSESSMENT — VISUAL ACUITY
OS_SC: 20/40
METHOD: SNELLEN - LINEAR
CORRECTION_TYPE: GLASSES
OD_CC: 20/30
OS_CC: 20/40
OD_CC: J1
OS_CC: J1+
OS_PH_SC+: +1
OD_SC: 20/25
OS_PH_SC: 20/30

## 2024-02-21 ASSESSMENT — CONF VISUAL FIELD
OS_INFERIOR_NASAL_RESTRICTION: 0
OD_NORMAL: 1
OD_SUPERIOR_NASAL_RESTRICTION: 0
OS_NORMAL: 1
OD_INFERIOR_NASAL_RESTRICTION: 0
OS_SUPERIOR_NASAL_RESTRICTION: 0
OS_INFERIOR_TEMPORAL_RESTRICTION: 0
OS_SUPERIOR_TEMPORAL_RESTRICTION: 0
OD_INFERIOR_TEMPORAL_RESTRICTION: 0
OD_SUPERIOR_TEMPORAL_RESTRICTION: 0

## 2024-02-21 ASSESSMENT — SLIT LAMP EXAM - LIDS: COMMENTS: 2+ DERMATOCHALASIS - UPPER LID, FEW PAPILLOMATA

## 2024-02-21 ASSESSMENT — REFRACTION_MANIFEST
OD_CYLINDER: SPHERE
OD_ADD: +3.00
OD_SPHERE: -0.25
OS_ADD: +3.00
OS_CYLINDER: +1.00
OS_SPHERE: -1.00
OS_AXIS: 082

## 2024-02-21 ASSESSMENT — TONOMETRY
OS_IOP_MMHG: 18
IOP_METHOD: ICARE
OD_IOP_MMHG: 10

## 2024-02-21 ASSESSMENT — EXTERNAL EXAM - RIGHT EYE: OD_EXAM: NORMAL

## 2024-02-21 ASSESSMENT — EXTERNAL EXAM - LEFT EYE: OS_EXAM: NORMAL

## 2024-02-21 ASSESSMENT — CUP TO DISC RATIO
OD_RATIO: 0.4
OS_RATIO: 0.5

## 2024-02-21 NOTE — PATIENT INSTRUCTIONS
"Glasses prescription given - optional    May use artificial tears up to four times a day (like Refresh Optive, Systane Balance, or TheraTears. Avoid \"get the red out\" drops and generic artifical tears).     Possible clouding of posterior capsule both eyes discussed.    Referral sent to Dr. Karlie Wheeler in [place]. His office will contact you to schedule an evaluation. Recommend waiting for surgery until blood pressure is more stable.    Return visit 6 months for an intraocular pressure check, glaucoma OCT, retinal OCT, and Jennings Visual Field.     Dhruv Estrella M.D.  [phone]    "

## 2024-02-21 NOTE — PROGRESS NOTES
" Current Eye Medications:  None.       Subjective:  Comprehensive Eye Exam.  No vision changes or concerns.  She wears glasses primarily for reading.  She is aware of her upper eyelids drooping.    Recent ER visit with significant HTN.     Objective:  See Ophthalmology Exam.       Assessment:  Mild hypertensive retinopathy both eyes.  Stable intraocular pressure and discs off drops.  Probably visually significant dermatochalasis upper lids.      ICD-10-CM    1. Pseudophakia of both eyes (iStent ou - [place])  Z96.1       2. Primary open angle glaucoma (POAG) of both eyes, mild stage  H40.1131       3. Hypertensive retinopathy of both eyes  H35.033       4. 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 Dermatochalasis of both upper eyelids  H02.831     H02.834       6. Posterior vitreous detachment, left eye  H43.812       7. Encounter for examination of eyes and vision with abnormal findings  Z01.01       8. Presbyopia  H52.4            Plan:  Glasses prescription given - optional    May use artificial tears up to four times a day (like Refresh Optive, Systane Balance, or TheraTears. Avoid \"get the red out\" drops and generic artifical tears).     Possible clouding of posterior capsule both eyes discussed.    Referral sent to Dr. Karlie Wheeler in [place]. His office will contact you to schedule an evaluation. Recommend waiting for surgery until blood pressure is more stable.    Return visit 6 months for an intraocular pressure check, glaucoma OCT, retinal OCT, and Jennings Visual Field.     Dhruv Estrella M.D.  [phone]       "

## 2024-02-21 NOTE — LETTER
"    2/21/2024         RE: Sharmin Shipley  [address] [place] [id number]        Dear Colleague,    Thank you for referring your patient, Sharmin Shipley, to the [facility]. Please see a copy of my visit note below.     Current Eye Medications:  None.       Subjective:  Comprehensive Eye Exam.  No vision changes or concerns.  She wears glasses primarily for reading.  She is aware of her upper eyelids drooping.    Recent ER visit with significant HTN.     Objective:  See Ophthalmology Exam.       Assessment:  Mild hypertensive retinopathy both eyes.  Stable intraocular pressure and discs off drops.  Probably visually significant dermatochalasis upper lids.      ICD-10-CM    1. Pseudophakia of both eyes (iStent ou - [place])  Z96.1       2. Primary open angle glaucoma (POAG) of both eyes, mild stage  H40.1131       3. Hypertensive retinopathy of both eyes  H35.033       4. 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 Dermatochalasis of both upper eyelids  H02.831     H02.834       6. Posterior vitreous detachment, left eye  H43.812       7. Encounter for examination of eyes and vision with abnormal findings  Z01.01       8. Presbyopia  H52.4            Plan:  Glasses prescription given - optional    May use artificial tears up to four times a day (like Refresh Optive, Systane Balance, or TheraTears. Avoid \"get the red out\" drops and generic artifical tears).     Possible clouding of posterior capsule both eyes discussed.    Referral sent to Dr. Karlie Wheeler in [place]. His office will contact you to schedule an evaluation. Recommend waiting for surgery until blood pressure is more stable.    Return visit 6 months for an intraocular pressure check, glaucoma OCT, retinal OCT, and Jennings Visual Field.     Dhruv Estrella M.D.  [phone]         Again, thank you for allowing me to participate in the care of your patient.        Sincerely,        Dhruv" RUEL Estrella MD

## 2024-08-23 ENCOUNTER — OFFICE VISIT (OUTPATIENT)
Dept: OPHTHALMOLOGY | Facility: CLINIC | Age: 79
End: 2024-08-23
Payer: COMMERCIAL

## 2024-08-23 DIAGNOSIS — H40.1131 PRIMARY OPEN ANGLE GLAUCOMA (POAG) OF BOTH EYES, MILD STAGE: Primary | ICD-10-CM

## 2024-08-23 PROCEDURE — 92083 EXTENDED VISUAL FIELD XM: CPT | Performed by: OPHTHALMOLOGY

## 2024-08-23 PROCEDURE — 92012 INTRM OPH EXAM EST PATIENT: CPT | Performed by: OPHTHALMOLOGY

## 2024-08-23 PROCEDURE — 92133 CPTRZD OPH DX IMG PST SGM ON: CPT | Performed by: OPHTHALMOLOGY

## 2024-08-23 RX ORDER — LOSARTAN POTASSIUM 50 MG/1
50 TABLET ORAL 2 TIMES DAILY
COMMUNITY
Start: 2024-06-09

## 2024-08-23 RX ORDER — RIVAROXABAN 2.5 MG/1
2.5 TABLET, FILM COATED ORAL
COMMUNITY
Start: 2024-05-13

## 2024-08-23 RX ORDER — LEVOTHYROXINE SODIUM 112 UG/1
112 TABLET ORAL DAILY
COMMUNITY
Start: 2024-07-10

## 2024-08-23 RX ORDER — FUROSEMIDE 20 MG
TABLET ORAL
COMMUNITY
Start: 2024-03-27 | End: 2024-08-23

## 2024-08-23 RX ORDER — CARVEDILOL 25 MG/1
25 TABLET ORAL 2 TIMES DAILY WITH MEALS
COMMUNITY
Start: 2024-06-24

## 2024-08-23 ASSESSMENT — VISUAL ACUITY
OS_SC: 20/30
OS_SC+: -2
OD_SC+: -2
METHOD: SNELLEN - LINEAR
OD_SC: 20/20

## 2024-08-23 ASSESSMENT — TONOMETRY
OD_IOP_MMHG: 11
OS_IOP_MMHG: 18
OD_IOP_MMHG: 11
OS_IOP_MMHG: 19
IOP_METHOD: APPLANATION
IOP_METHOD: ICARE

## 2024-08-23 NOTE — Clinical Note
8/23/2024      Sharmin Shipley  [address] [place] [id number]      Dear Colleague,    Thank you for referring your patient, Sharmin Shipley, to the [facility]. Please see a copy of my visit note below.    No notes on file    Again, thank you for allowing me to participate in the care of your patient.        Sincerely,        Dhruv Estrella MD

## 2024-08-23 NOTE — PROGRESS NOTES
Current Eye Medications:  none     Subjective:  HVF, OCT and IOP - vision overall unchanged. No pain or discomfort either eye.      Objective:  See Ophthalmology Exam.       Assessment:  Stable intraocular pressures, glaucoma OCT, and Jennings Visual Field both eyes in patient with mild open angle glaucoma.      Plan:  Continue observation with regard to glaucoma suspect status.    Return visit in 6 months for a complete exam.     Dhruv Estrella M.D.  [phone]

## 2024-08-23 NOTE — PATIENT INSTRUCTIONS
Continue observation with regard to glaucoma suspect status.    Return visit in 6 months for a complete exam.     Dhruv Estrella M.D.  [phone]

## 2024-09-14 ASSESSMENT — PACHYMETRY
OS_CT(UM): .489
OD_CT(UM): .506

## 2024-09-14 ASSESSMENT — SLIT LAMP EXAM - LIDS: COMMENTS: 2+ DERMATOCHALASIS - UPPER LID, FEW PAPILLOMATA

## 2024-09-14 ASSESSMENT — EXTERNAL EXAM - RIGHT EYE: OD_EXAM: NORMAL

## 2024-09-14 ASSESSMENT — EXTERNAL EXAM - LEFT EYE: OS_EXAM: NORMAL

## 2024-09-29 ENCOUNTER — LAB REQUISITION (OUTPATIENT)
Dept: LAB | Facility: CLINIC | Age: 79
End: 2024-09-29
Payer: COMMERCIAL

## 2024-09-29 DIAGNOSIS — D64.9 ANEMIA, UNSPECIFIED: ICD-10-CM

## 2024-09-30 LAB
ERYTHROCYTE [DISTWIDTH] IN BLOOD BY AUTOMATED COUNT: 14.6 % (ref 10–15)
HCT VFR BLD AUTO: 27.8 % (ref 35–47)
HGB BLD-MCNC: 8.5 G/DL (ref 11.7–15.7)
MCH RBC QN AUTO: 28.3 PG (ref 26.5–33)
MCHC RBC AUTO-ENTMCNC: 30.6 G/DL (ref 31.5–36.5)
MCV RBC AUTO: 93 FL (ref 78–100)
PLATELET # BLD AUTO: 357 10E3/UL (ref 150–450)
RBC # BLD AUTO: 3 10E6/UL (ref 3.8–5.2)
WBC # BLD AUTO: 7.5 10E3/UL (ref 4–11)

## 2024-09-30 PROCEDURE — 36415 COLL VENOUS BLD VENIPUNCTURE: CPT | Mod: ORL | Performed by: NURSE PRACTITIONER

## 2024-09-30 PROCEDURE — P9604 ONE-WAY ALLOW PRORATED TRIP: HCPCS | Mod: ORL | Performed by: NURSE PRACTITIONER

## 2024-09-30 PROCEDURE — 85027 COMPLETE CBC AUTOMATED: CPT | Mod: ORL | Performed by: NURSE PRACTITIONER

## 2024-10-04 ENCOUNTER — LAB REQUISITION (OUTPATIENT)
Dept: LAB | Facility: CLINIC | Age: 79
End: 2024-10-04
Payer: COMMERCIAL

## 2024-10-04 DIAGNOSIS — D64.9 ANEMIA, UNSPECIFIED: ICD-10-CM

## 2024-10-05 ENCOUNTER — LAB REQUISITION (OUTPATIENT)
Dept: LAB | Facility: CLINIC | Age: 79
End: 2024-10-05
Payer: COMMERCIAL

## 2024-10-05 DIAGNOSIS — D64.9 ANEMIA, UNSPECIFIED: ICD-10-CM

## 2024-10-07 LAB — HGB BLD-MCNC: 6.6 G/DL (ref 11.7–15.7)

## 2024-10-07 PROCEDURE — 85018 HEMOGLOBIN: CPT | Mod: ORL | Performed by: NURSE PRACTITIONER

## 2024-10-07 PROCEDURE — P9604 ONE-WAY ALLOW PRORATED TRIP: HCPCS | Mod: ORL | Performed by: NURSE PRACTITIONER

## 2024-10-07 PROCEDURE — 36415 COLL VENOUS BLD VENIPUNCTURE: CPT | Mod: ORL | Performed by: NURSE PRACTITIONER

## 2025-02-08 ENCOUNTER — HEALTH MAINTENANCE LETTER (OUTPATIENT)
Age: 80
End: 2025-02-08

## 2025-03-25 ENCOUNTER — OFFICE VISIT (OUTPATIENT)
Dept: OPHTHALMOLOGY | Facility: CLINIC | Age: 80
End: 2025-03-25
Payer: COMMERCIAL

## 2025-03-25 DIAGNOSIS — H40.1131 PRIMARY OPEN ANGLE GLAUCOMA (POAG) OF BOTH EYES, MILD STAGE: ICD-10-CM

## 2025-03-25 DIAGNOSIS — Z01.01 ENCOUNTER FOR EXAMINATION OF EYES AND VISION WITH ABNORMAL FINDINGS: ICD-10-CM

## 2025-03-25 DIAGNOSIS — H02.834 DERMATOCHALASIS OF BOTH UPPER EYELIDS: ICD-10-CM

## 2025-03-25 DIAGNOSIS — H43.812 POSTERIOR VITREOUS DETACHMENT, LEFT EYE: ICD-10-CM

## 2025-03-25 DIAGNOSIS — H26.8 PSEUDOEXFOLIATION OF LENS CAPSULE: ICD-10-CM

## 2025-03-25 DIAGNOSIS — H02.831 DERMATOCHALASIS OF BOTH UPPER EYELIDS: ICD-10-CM

## 2025-03-25 DIAGNOSIS — Z96.1 PSEUDOPHAKIA OF BOTH EYES: Primary | ICD-10-CM

## 2025-03-25 DIAGNOSIS — H52.4 PRESBYOPIA: ICD-10-CM

## 2025-03-25 PROCEDURE — 92015 DETERMINE REFRACTIVE STATE: CPT | Performed by: OPHTHALMOLOGY

## 2025-03-25 PROCEDURE — 92014 COMPRE OPH EXAM EST PT 1/>: CPT | Performed by: OPHTHALMOLOGY

## 2025-03-25 ASSESSMENT — CUP TO DISC RATIO
OD_RATIO: 0.4
OS_RATIO: 0.5

## 2025-03-25 ASSESSMENT — CONF VISUAL FIELD
OS_NORMAL: 1
OS_SUPERIOR_NASAL_RESTRICTION: 0
OD_INFERIOR_TEMPORAL_RESTRICTION: 0
OD_INFERIOR_NASAL_RESTRICTION: 0
OD_NORMAL: 1
OD_SUPERIOR_TEMPORAL_RESTRICTION: 0
OS_INFERIOR_NASAL_RESTRICTION: 0
OS_SUPERIOR_TEMPORAL_RESTRICTION: 0
OD_SUPERIOR_NASAL_RESTRICTION: 0
OS_INFERIOR_TEMPORAL_RESTRICTION: 0

## 2025-03-25 ASSESSMENT — VISUAL ACUITY
CORRECTION_TYPE: GLASSES
OS_CC+: -2
METHOD: SNELLEN - LINEAR
OD_CC+: -1
OS_CC: 20/25
OS_CC: J1
OD_CC: 20/25
OD_CC: J2

## 2025-03-25 ASSESSMENT — REFRACTION_MANIFEST
OD_CYLINDER: SPHERE
OS_AXIS: 088
OS_CYLINDER: +0.50
OD_ADD: +3.00
OS_SPHERE: -0.75
OS_ADD: +3.00
OD_SPHERE: -0.50

## 2025-03-25 ASSESSMENT — REFRACTION_WEARINGRX
OS_CYLINDER: SPHERE
OD_SPHERE: -0.25
OD_ADD: +3.00
SPECS_TYPE: PAL
OS_SPHERE: -0.25
OS_ADD: +3.00
OD_CYLINDER: SPHERE

## 2025-03-25 ASSESSMENT — SLIT LAMP EXAM - LIDS: COMMENTS: 2+ DERMATOCHALASIS - UPPER LID, FEW PAPILLOMATA

## 2025-03-25 ASSESSMENT — EXTERNAL EXAM - LEFT EYE: OS_EXAM: NORMAL

## 2025-03-25 ASSESSMENT — EXTERNAL EXAM - RIGHT EYE: OD_EXAM: NORMAL

## 2025-03-25 ASSESSMENT — TONOMETRY
OD_IOP_MMHG: 10
IOP_METHOD: ICARE
OS_IOP_MMHG: 16

## 2025-03-25 NOTE — PATIENT INSTRUCTIONS
"Glasses prescription given - optional    May use artificial tears up to four times a day (like Refresh Optive, Systane Balance, or TheraTears. Avoid \"get the red out\" drops and generic artifical tears).     Possible clouding of posterior capsule both eyes discussed.     Call in November 2025 for an appointment in March 2026  for Complete Exam    Dr. Estrella [phone]    "

## 2025-03-25 NOTE — LETTER
"3/25/2025      Sharmin Shipley  [address] [facility] [address]      Dear Colleague,    Thank you for referring your patient, Sharmin Shipley, to the [facility]. Please see a copy of my visit note below.     Current Eye Medications:  artificial tear as needed      Subjective:  Complete exam: patient feels her eyes are doing pretty well, she did have a bout of migraines in January.  She was diagnosed as anemic and she feels better now.  She feels she needs her glasses more often to read.     Never had lid eval.  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  On Plavix.  Had subclavian stent.  May need CEA.     Objective:  See Ophthalmology Exam.       Assessment:  Stable eye exam.  Intraocular pressure remains stable after cataract surgery and iStent both eyes.      ICD-10-CM    1. Pseudophakia of both eyes (iStent ou - [place])  Z96.1       2. Primary open angle glaucoma (POAG) of both eyes, mild stage  H40.1131       3. 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 Dermatochalasis of both upper eyelids  H02.831     H02.834       5. Posterior vitreous detachment, left eye  H43.812       6. Encounter for examination of eyes and vision with abnormal findings  Z01.01       7. Presbyopia  H52.4            Plan:  Glasses prescription given - optional    May use artificial tears up to four times a day (like Refresh Optive, Systane Balance, or TheraTears. Avoid \"get the red out\" drops and generic artifical tears).     Possible clouding of posterior capsule both eyes discussed.     Encouraged to discuss smoking cessation with PCP.    Call in November 2025 for an appointment in March 2026 for Complete Exam    Dr. Estrella [phone]           Again, thank you for allowing me to participate in the care of your patient.        Sincerely,        Dhruv Estrella MD    Electronically signed"

## 2025-03-25 NOTE — PROGRESS NOTES
" Current Eye Medications:  artificial tear as needed      Subjective:  Complete exam: patient feels her eyes are doing pretty well, she did have a bout of migraines in January.  She was diagnosed as anemic and she feels better now.  She feels she needs her glasses more often to read.     Never had lid eval.  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  On Plavix.  Had subclavian stent.  May need CEA.     Objective:  See Ophthalmology Exam.       Assessment:  Stable eye exam.  Intraocular pressure remains stable after cataract surgery and iStent both eyes.      ICD-10-CM    1. Pseudophakia of both eyes (iStent ou - [place])  Z96.1       2. Primary open angle glaucoma (POAG) of both eyes, mild stage  H40.1131       3. 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 Dermatochalasis of both upper eyelids  H02.831     H02.834       5. Posterior vitreous detachment, left eye  H43.812       6. Encounter for examination of eyes and vision with abnormal findings  Z01.01       7. Presbyopia  H52.4            Plan:  Glasses prescription given - optional    May use artificial tears up to four times a day (like Refresh Optive, Systane Balance, or TheraTears. Avoid \"get the red out\" drops and generic artifical tears).     Possible clouding of posterior capsule both eyes discussed.     Encouraged to discuss smoking cessation with PCP.    Call in November 2025 for an appointment in March 2026 for Complete Exam    Dr. Estrella [phone]         "

## 2025-04-01 ENCOUNTER — LAB REQUISITION (OUTPATIENT)
Dept: LAB | Facility: CLINIC | Age: 80
End: 2025-04-01
Payer: COMMERCIAL

## 2025-04-01 DIAGNOSIS — R82.998 OTHER ABNORMAL FINDINGS IN URINE: ICD-10-CM

## 2025-04-01 PROCEDURE — 87086 URINE CULTURE/COLONY COUNT: CPT | Mod: ORL | Performed by: OBSTETRICS & GYNECOLOGY

## 2025-04-01 PROCEDURE — 87186 SC STD MICRODIL/AGAR DIL: CPT | Mod: ORL | Performed by: OBSTETRICS & GYNECOLOGY

## 2025-04-03 LAB — BACTERIA UR CULT: ABNORMAL

## 2025-05-23 ENCOUNTER — LAB REQUISITION (OUTPATIENT)
Dept: LAB | Facility: CLINIC | Age: 80
End: 2025-05-23
Payer: COMMERCIAL

## 2025-05-23 DIAGNOSIS — N94.89 OTHER SPECIFIED CONDITIONS ASSOCIATED WITH FEMALE GENITAL ORGANS AND MENSTRUAL CYCLE: ICD-10-CM

## 2025-05-23 PROCEDURE — 87086 URINE CULTURE/COLONY COUNT: CPT | Mod: ORL

## 2025-05-25 LAB — BACTERIA UR CULT: NORMAL

## (undated) DEVICE — EYE RING MALYUGIN PUPIL EXPANDER 6.25MM MAL-000-1

## (undated) DEVICE — EYE KNIFE STILETTO VISITEC 1.1MM ANG 45DEG SIDEPORT 376620

## (undated) DEVICE — EYE TIP IRRIGATION & ASPIRATION POLYMER CVD 0.3MM 8065751512

## (undated) DEVICE — EYE PACK CUSTOM ANTERIOR 30DEG TIP CENTURION PPK6682-04

## (undated) DEVICE — EYE CANN IRR 25GA CYSTOTOME 581610

## (undated) DEVICE — PACK CATARACT CUSTOM ASC SEY15CPUMC

## (undated) DEVICE — LINEN TOWEL PACK X5 5464

## (undated) DEVICE — EYE KNIFE SLIT XSTAR VISITEC 2.6MM 45DEG 373726

## (undated) DEVICE — EYE CANN IRR 27GA ANTERIOR CHAMBER 581280

## (undated) DEVICE — SOL WATER IRRIG 500ML BOTTLE 2F7113

## (undated) DEVICE — GLOVE PROTEXIS MICRO 7.5  2D73PM75

## (undated) DEVICE — EYE SHIELD PLASTIC

## (undated) RX ORDER — LIDOCAINE HYDROCHLORIDE AND EPINEPHRINE 10; 10 MG/ML; UG/ML
INJECTION, SOLUTION INFILTRATION; PERINEURAL
Status: DISPENSED
Start: 2022-11-14

## (undated) RX ORDER — ACETAMINOPHEN 325 MG/1
TABLET ORAL
Status: DISPENSED
Start: 2022-11-14

## (undated) RX ORDER — LIDOCAINE HYDROCHLORIDE 20 MG/ML
INJECTION, SOLUTION EPIDURAL; INFILTRATION; INTRACAUDAL; PERINEURAL
Status: DISPENSED
Start: 2022-11-14

## (undated) RX ORDER — FENTANYL CITRATE 50 UG/ML
INJECTION, SOLUTION INTRAMUSCULAR; INTRAVENOUS
Status: DISPENSED
Start: 2022-10-31

## (undated) RX ORDER — FENTANYL CITRATE 50 UG/ML
INJECTION, SOLUTION INTRAMUSCULAR; INTRAVENOUS
Status: DISPENSED
Start: 2022-11-14